# Patient Record
Sex: MALE | Race: WHITE | NOT HISPANIC OR LATINO | Employment: OTHER | URBAN - METROPOLITAN AREA
[De-identification: names, ages, dates, MRNs, and addresses within clinical notes are randomized per-mention and may not be internally consistent; named-entity substitution may affect disease eponyms.]

---

## 2017-01-01 ENCOUNTER — GENERIC CONVERSION - ENCOUNTER (OUTPATIENT)
Dept: OTHER | Facility: OTHER | Age: 82
End: 2017-01-01

## 2017-02-21 ENCOUNTER — ALLSCRIPTS OFFICE VISIT (OUTPATIENT)
Dept: OTHER | Facility: OTHER | Age: 82
End: 2017-02-21

## 2017-06-26 ENCOUNTER — ALLSCRIPTS OFFICE VISIT (OUTPATIENT)
Dept: OTHER | Facility: OTHER | Age: 82
End: 2017-06-26

## 2017-06-28 ENCOUNTER — GENERIC CONVERSION - ENCOUNTER (OUTPATIENT)
Dept: OTHER | Facility: OTHER | Age: 82
End: 2017-06-28

## 2017-06-28 ENCOUNTER — LAB CONVERSION - ENCOUNTER (OUTPATIENT)
Dept: OTHER | Facility: OTHER | Age: 82
End: 2017-06-28

## 2017-06-28 LAB
A/G RATIO (HISTORICAL): 1.4 (CALC) (ref 1–2.5)
ALBUMIN SERPL BCP-MCNC: 4.3 G/DL (ref 3.6–5.1)
ALP SERPL-CCNC: 74 U/L (ref 40–115)
ALT SERPL W P-5'-P-CCNC: 17 U/L (ref 9–46)
AST SERPL W P-5'-P-CCNC: 20 U/L (ref 10–35)
BASOPHILS # BLD AUTO: 0.1 %
BASOPHILS # BLD AUTO: 8 CELLS/UL (ref 0–200)
BILIRUB SERPL-MCNC: 1.1 MG/DL (ref 0.2–1.2)
BUN SERPL-MCNC: 16 MG/DL (ref 7–25)
BUN/CREA RATIO (HISTORICAL): 13 (CALC) (ref 6–22)
CALCIUM SERPL-MCNC: 9.3 MG/DL (ref 8.6–10.3)
CHLORIDE SERPL-SCNC: 105 MMOL/L (ref 98–110)
CHOLEST SERPL-MCNC: 173 MG/DL (ref 125–200)
CHOLEST/HDLC SERPL: 4.1 (CALC)
CO2 SERPL-SCNC: 23 MMOL/L (ref 20–31)
CREAT SERPL-MCNC: 1.19 MG/DL (ref 0.7–1.11)
DEPRECATED RDW RBC AUTO: 15.9 % (ref 11–15)
EGFR AFRICAN AMERICAN (HISTORICAL): 66 ML/MIN/1.73M2
EGFR-AMERICAN CALC (HISTORICAL): 57 ML/MIN/1.73M2
EOSINOPHIL # BLD AUTO: 257 CELLS/UL (ref 15–500)
EOSINOPHIL # BLD AUTO: 3.1 %
GAMMA GLOBULIN (HISTORICAL): 3 G/DL (CALC) (ref 1.9–3.7)
GLUCOSE (HISTORICAL): 82 MG/DL (ref 65–99)
HCT VFR BLD AUTO: 45.1 % (ref 38.5–50)
HDLC SERPL-MCNC: 42 MG/DL
HGB BLD-MCNC: 14.3 G/DL (ref 13.2–17.1)
LDL CHOLESTEROL (HISTORICAL): 86 MG/DL (CALC)
LYMPHOCYTES # BLD AUTO: 2050 CELLS/UL (ref 850–3900)
LYMPHOCYTES # BLD AUTO: 24.7 %
MAGNESIUM SERPL-MCNC: 2.2 MG/DL (ref 1.5–2.5)
MCH RBC QN AUTO: 23.8 PG (ref 27–33)
MCHC RBC AUTO-ENTMCNC: 31.8 G/DL (ref 32–36)
MCV RBC AUTO: 74.8 FL (ref 80–100)
MONOCYTES # BLD AUTO: 822 CELLS/UL (ref 200–950)
MONOCYTES (HISTORICAL): 9.9 %
NEUTROPHILS # BLD AUTO: 5163 CELLS/UL (ref 1500–7800)
NEUTROPHILS # BLD AUTO: 62.2 %
NON-HDL-CHOL (CHOL-HDL) (HISTORICAL): 131 MG/DL (CALC)
PLATELET # BLD AUTO: 138 THOUSAND/UL (ref 140–400)
PMV BLD AUTO: 12.9 FL (ref 7.5–12.5)
POTASSIUM SERPL-SCNC: 5.7 MMOL/L (ref 3.5–5.3)
PROSTATE SPECIFIC ANTIGEN TOTAL (HISTORICAL): 1.8 NG/ML
RBC # BLD AUTO: 6.03 MILLION/UL (ref 4.2–5.8)
SODIUM SERPL-SCNC: 139 MMOL/L (ref 135–146)
SPECIMEN STATUS REPORT (HISTORICAL): NORMAL
TOTAL PROTEIN (HISTORICAL): 7.3 G/DL (ref 6.1–8.1)
TRIGL SERPL-MCNC: 227 MG/DL
TSH SERPL DL<=0.05 MIU/L-ACNC: 1.63 MIU/L (ref 0.4–4.5)
WBC # BLD AUTO: 8.3 THOUSAND/UL (ref 3.8–10.8)

## 2017-09-08 ENCOUNTER — GENERIC CONVERSION - ENCOUNTER (OUTPATIENT)
Dept: OTHER | Facility: OTHER | Age: 82
End: 2017-09-08

## 2017-10-12 ENCOUNTER — ALLSCRIPTS OFFICE VISIT (OUTPATIENT)
Dept: OTHER | Facility: OTHER | Age: 82
End: 2017-10-12

## 2017-10-13 ENCOUNTER — LAB CONVERSION - ENCOUNTER (OUTPATIENT)
Dept: OTHER | Facility: OTHER | Age: 82
End: 2017-10-13

## 2017-10-13 ENCOUNTER — GENERIC CONVERSION - ENCOUNTER (OUTPATIENT)
Dept: OTHER | Facility: OTHER | Age: 82
End: 2017-10-13

## 2017-10-13 LAB
A/G RATIO (HISTORICAL): 1.5 (CALC) (ref 1–2.5)
ALBUMIN SERPL BCP-MCNC: 4.3 G/DL (ref 3.6–5.1)
ALP SERPL-CCNC: 71 U/L (ref 40–115)
ALT SERPL W P-5'-P-CCNC: 12 U/L (ref 9–46)
AST SERPL W P-5'-P-CCNC: 17 U/L (ref 10–35)
BASOPHILS # BLD AUTO: 0.7 %
BASOPHILS # BLD AUTO: 59 CELLS/UL (ref 0–200)
BILIRUB SERPL-MCNC: 1.2 MG/DL (ref 0.2–1.2)
BUN SERPL-MCNC: 17 MG/DL (ref 7–25)
BUN/CREA RATIO (HISTORICAL): NORMAL (CALC) (ref 6–22)
CALCIUM SERPL-MCNC: 9.3 MG/DL (ref 8.6–10.3)
CHLORIDE SERPL-SCNC: 106 MMOL/L (ref 98–110)
CHOLEST SERPL-MCNC: 151 MG/DL
CHOLEST/HDLC SERPL: 4 (CALC)
CO2 SERPL-SCNC: 24 MMOL/L (ref 20–31)
CREAT SERPL-MCNC: 1.07 MG/DL (ref 0.7–1.11)
DEPRECATED RDW RBC AUTO: 16.2 % (ref 11–15)
EGFR AFRICAN AMERICAN (HISTORICAL): 76 ML/MIN/1.73M2
EGFR-AMERICAN CALC (HISTORICAL): 65 ML/MIN/1.73M2
EOSINOPHIL # BLD AUTO: 294 CELLS/UL (ref 15–500)
EOSINOPHIL # BLD AUTO: 3.5 %
GAMMA GLOBULIN (HISTORICAL): 2.8 G/DL (CALC) (ref 1.9–3.7)
GLUCOSE (HISTORICAL): 87 MG/DL (ref 65–99)
HCT VFR BLD AUTO: 45.1 % (ref 38.5–50)
HDLC SERPL-MCNC: 38 MG/DL
HGB BLD-MCNC: 14.4 G/DL (ref 13.2–17.1)
LDL CHOLESTEROL (HISTORICAL): 89 MG/DL (CALC)
LYMPHOCYTES # BLD AUTO: 2344 CELLS/UL (ref 850–3900)
LYMPHOCYTES # BLD AUTO: 27.9 %
MCH RBC QN AUTO: 23.2 PG (ref 27–33)
MCHC RBC AUTO-ENTMCNC: 31.9 G/DL (ref 32–36)
MCV RBC AUTO: 72.5 FL (ref 80–100)
MONOCYTES # BLD AUTO: 958 CELLS/UL (ref 200–950)
MONOCYTES (HISTORICAL): 11.4 %
NEUTROPHILS # BLD AUTO: 4746 CELLS/UL (ref 1500–7800)
NEUTROPHILS # BLD AUTO: 56.5 %
NON-HDL-CHOL (CHOL-HDL) (HISTORICAL): 113 MG/DL (CALC)
PLATELET # BLD AUTO: 171 THOUSAND/UL (ref 140–400)
PMV BLD AUTO: 12.4 FL (ref 7.5–12.5)
POTASSIUM SERPL-SCNC: 4.8 MMOL/L (ref 3.5–5.3)
RBC # BLD AUTO: 6.22 MILLION/UL (ref 4.2–5.8)
SODIUM SERPL-SCNC: 139 MMOL/L (ref 135–146)
TOTAL PROTEIN (HISTORICAL): 7.1 G/DL (ref 6.1–8.1)
TRIGL SERPL-MCNC: 147 MG/DL
TSH SERPL DL<=0.05 MIU/L-ACNC: 1.8 MIU/L (ref 0.4–4.5)
WBC # BLD AUTO: 8.4 THOUSAND/UL (ref 3.8–10.8)

## 2017-10-13 NOTE — PROGRESS NOTES
Assessment  1  Paresthesia of both feet (782 0) (R20 2)   2  Need for vaccination (V05 9) (Z23)   3  Pruritus of scalp (698 9) (L29 9)   4  Polymyalgia rheumatica (725) (M35 3)   5  Thalassemia (282 40) (D56 9)   6  Arteriosclerotic heart disease (414 00) (I25 10)    Plan  Arteriosclerotic heart disease, Benign essential hypertension, Benign prostatic  hyperplasia, Familial combined hyperlipidemia, Thalassemia    · (1) LIPID PANEL FASTING W DIRECT LDL REFLEX; Status:Canceled;    · (1) TSH WITH FT4 REFLEX; Status:Canceled; Arteriosclerotic heart disease, Benign essential hypertension, Familial combined  hyperlipidemia, Need for vaccination, Polymyalgia rheumatica, Thalassemia    · (1) CBC/PLT/DIFF; Status:Active; Requested for:12Oct2017;    · (1) COMPREHENSIVE METABOLIC PANEL; Status:Active; Requested for:12Oct2017;    · (1) LIPID PANEL FASTING W DIRECT LDL REFLEX; Status:Active; Requested  for:12Oct2017;    · (1) TSH WITH FT4 REFLEX; Status:Active; Requested for:12Oct2017;   Need for vaccination    · Fluzone High-Dose 0 5 ML Intramuscular Suspension Prefilled Syringe;  INJECT 0 5  ML Intramuscular; To Be Done: [id number]  PMH: Enlarged prostate without lower urinary tract symptoms (luts)    · Triamcinolone Acetonide 0 5 % External Cream; APPLY 2-3 TIMES DAILY TO  AFFECTED AREA(S)  Pruritus of scalp    · Triamcinolone Acetonide 0 5 % External Cream; APPLY 2-3 TIMES DAILY TO  AFFECTED AREA(S)   · Ketoconazole 2 % External Shampoo; SHAMPOO AS DIRECTED   · Triamcinolone Acetonide 0 1 % External Cream; APPLY  AND RUB  IN A THIN  FILM TO AFFECTED AREAS TWICE DAILY  (AM AND PM)    Discussion/Summary    --Paresthesias b/l feet: Patient presents with intermittent bilateral paresthesias of feet for the past few weeks  Patient admits to occasional radicular symptoms bilaterally, but denies any back pain  His exam today is unremarkable  Will check labs  If negative, consider checking EMG lower extremities  pruritus: Rx given triamcinolone cream when necessary  Call further problemscontrolled on lisinopril 10 mg qdstable  pt continues to lead an active lifestyle  denies any cp/sob  cont yearly f/u w/ cardiology (dr Litzy Ahmadi: stable  will cont to monitor cbcsof PMR: No recent symptoms  shot todaycall w/ lab resultsmos  Possible side effects of new medications were reviewed with the patient/guardian today  The treatment plan was reviewed with the patient/guardian  The patient/guardian understands and agrees with the treatment plan      Chief Complaint  Pt c/o B/L foot pain x3-4 days  Pt describes it as a nerve pain (sometimes sharp, and sometimes radiates down leg)  Pt has not taken anything for symptoms  Pt would like flu shot today  Pt would like refills of Ketoconazole shampoo and Triamcinolone cream       History of Present Illness  HPI: Patient presents with intermittent bilateral paresthesias of feet for the past few weeks  Patient admits to occasional radicular symptoms bilaterally, but denies any back pain  Otherwise he is feeling well  Doing well on blood pressure medication, lisinopril  No recent issues with polymyalgia symptoms  Patient does still complain of occasional scalp  He is and is requesting refill of his topical medication      Review of Systems    Constitutional: no fever or chills, feels well, no tiredness, no recent weight loss or weight gain  Cardiovascular: no complaints of slow or fast heart rate, no chest pain, no palpitations, no leg claudication or lower extremity edema  Respiratory: no complaints of shortness of breath, no wheezing or cough, no dyspnea on exertion, no orthopnea or PND  Gastrointestinal: no complaints of abdominal pain, no constipation, no nausea or vomiting, no diarrhea or bloody stools  Genitourinary: no complaints of dysuria or incontinence, no hesitancy, no nocturia, no genital lesion, no inadequacy of penile erection     Musculoskeletal: no complaints of arthralgia, no myalgia, no joint swelling or stiffness, no limb pain or swelling  Neurological: as noted in HPI  Active Problems  1  Arteriosclerotic heart disease (414 00) (I25 10)   2  Benign essential hypertension (401 1) (I10)   3  Benign prostatic hyperplasia (600 00) (N40 0)   4  Erectile dysfunction (607 84) (N52 9)   5  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)   6  Intervertebral Disc Degeneration (722 6)   7  Osteoporosis (733 00) (M81 0)   8  Polymyalgia rheumatica (725) (M35 3)   9  Thalassemia (282 40) (D56 9)    Past Medical History  1  History of Abnormal blood sugar (790 29) (R73 09)   2  Acute sinusitis (461 9) (J01 90)   3  History of Chronic left shoulder pain (719 41,338 29) (M25 512,G89 29)   4  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)   5  History of Depression screening (V79 0) (Z13 89)   6  History of Depression screening negative   7  History of Dysplastic Nevus (238 2)   8  History of Encounter for prostate cancer screening (V76 44) (Z12 5)   9  History of Enlarged prostate without lower urinary tract symptoms (luts) (600 00) (N40 0)   10  History of Erectile dysfunction (607 84) (N52 9)   11  History of Esophagitis, reflux (530 11) (K21 0)   12  History of Fatigue (780 79) (R53 83)   13  History of Folliculitis (839 7) (H32 6)   14  History of Groin strain (848 8) (S76 219A)   15  History of Groin strain (848 8) (S76 219A)   16  History of anemia (V12 3) (Z86 2)   17  History of chronic rhinitis (V12 69) (Z87 09)   18  History of dermatitis (V13 3) (Z87 2)   19  History of herpes zoster (V12 09) (Z86 19)   20  History of molluscum contagiosum (V12 00) (Z86 19)   21  History of seborrheic keratosis (V13 3) (Z87 2)   22  History of squamous cell carcinoma of skin (V10 83) (Z85 828)   23  History of Hyperlipidemia   24  History of Inflamed seborrheic keratosis (702 11) (L82 0)   25  History of Inflamed seborrheic keratosis (702 11) (L82 0)   26  History of Insomnia (780 52) (G47 00)   27   History of Lumbar Strain (847 2)   28  History of Memory Lapses Or Loss (780 93)   29  History of Nausea (787 02) (R11 0)   30  History of Need for 23-polyvalent pneumococcal polysaccharide vaccine (V03 82) (Z23)   31  History of Need for prophylactic vaccination and inoculation against influenza (V04 81)    (Z23)   32  History of Need for prophylactic vaccination and inoculation against influenza (V04 81)    (Z23)   33  History of Onychomycosis of toenail (110 1) (B35 1)   34  History of Plantar fasciitis (728 71) (M72 2)   35  History of Prostate cancer screening (V76 44) (Z12 5)   36  History of Screening for depression (V79 0) (Z13 89)   37  History of Screening for other and unspecified genitourinary condition (V81 6) (Z13 89)   38  History of Screening for other and unspecified genitourinary condition (V81 6) (Z13 89)   39  History of Screening for other and unspecified genitourinary condition (V81 6) (Z13 89)   40  History of Skin lesion (709 9) (L98 9)   41  History of Skin lesion of cheek (709 9) (L98 9)   42  History of Skin tag (701 9) (L91 8)   43  History of Special screening for other neurological conditions (V80 09) (Z13 89)   44  History of Special screening for other neurological conditions (V80 09) (Z13 89)   45  History of Thumb pain (729 5) (M79 646)   46  History of Thumb pain (729 5) (M79 646)  Active Problems And Past Medical History Reviewed: The active problems and past medical history were reviewed and updated today  Family History  Father    1  Family history of Diabetes mellitus    Social History   · Being A Social Drinker   · Former smoker (S69 21) (W30 582)  The social history was reviewed and updated today  The social history was reviewed and is unchanged  Surgical History  1  History of CABG   2  History of Hernia Repair   3  History of Spinal Diskectomy Lumbar    Current Meds   1  Finasteride 5 MG Oral Tablet; take one tablet by mouth daily;    Therapy: 84Obp5994 to (Evaluate:82Zcy8516)  Requested for: 18Ewp6022; Last   Rx:12Sep2017 Ordered   2  Ketoconazole 2 % External Shampoo; SHAMPOO AS DIRECTED; Therapy: 26LIF4732 to (Evaluate:38Blt8493)  Requested for: 95FUY4580; Last   Rx:11May2017 Ordered   3  Lisinopril 10 MG Oral Tablet; TAKE 1 TABLET DAILY; Therapy: 51MQR6215 to [phone])  Requested for: 01Aug2017; Last   Rx:01Aug2017 Ordered   4  Tamsulosin HCl - 0 4 MG Oral Capsule; TAKE 1 CAPSULE Bedtime; Therapy: 21CAH6632 to (Evaluate:06Nov2017)  Requested for: 46BBM3162; Last   Rx:11May2017 Ordered   5  Triamcinolone Acetonide 0 1 % External Cream; APPLY  AND RUB  IN A THIN FILM TO   AFFECTED AREAS TWICE DAILY  (AM AND PM); Therapy: 38PND5953 to (Last Rx:21Nov2016)  Requested for: 21Nov2016 Ordered   6  Triamcinolone Acetonide 0 5 % External Cream; APPLY 2-3 TIMES DAILY TO   AFFECTED AREA(S); Therapy: 36GBJ3229 to (Last Rx:09Ykc0314)  Requested for: 08Aug2016 Ordered   7  Zostavax 97663 UNT/0 65ML Subcutaneous Solution Reconstituted; INJECT 0 5  ML   Subcutaneous; Therapy: 05Kya3280 to (Last Rx:65Whw6696) Ordered    The medication list was reviewed and updated today  Allergies  1  Flumadine TABS   2  Vicodin TABS    Vitals   Recorded: 50WMU0883 08:23AM   Temperature 96 2 F, Tympanic   Heart Rate 50   Pulse Quality Normal   Respiration Quality Normal   Respiration 16   Systolic 042, LUE, Sitting   Diastolic 86, LUE, Sitting   Height 5 ft 8 3 in   Weight 192 lb 9 oz   BMI Calculated 29 02   BSA Calculated 2 02   O2 Saturation 98     Physical Exam    Constitutional   General appearance: No acute distress, well appearing and well nourished  Pulmonary   Respiratory effort: No increased work of breathing or signs of respiratory distress  Auscultation of lungs: Clear to auscultation, equal breath sounds bilaterally, no wheezes, no rales, no rhonci  Cardiovascular   Palpation of heart: Normal PMI, no thrills      Auscultation of heart: Normal rate and rhythm, normal S1 and S2, without murmurs  Examination of extremities for edema and/or varicosities: Normal     Carotid pulses: Normal     Lymphatic   Palpation of lymph nodes in neck: No lymphadenopathy  Psychiatric   Orientation to person, place and time: Normal     Mood and affect: Normal          Future Appointments    Date/Time Provider Specialty Site   12/28/2017 08:00 AM Melissa Pablo DO [address]     Signatures   Electronically signed by :  Jennifer Dean DO; Oct 12 2017  8:56AM EST                       (Author)

## 2017-10-25 ENCOUNTER — GENERIC CONVERSION - ENCOUNTER (OUTPATIENT)
Dept: OTHER | Facility: OTHER | Age: 82
End: 2017-10-25

## 2017-12-28 ENCOUNTER — ALLSCRIPTS OFFICE VISIT (OUTPATIENT)
Dept: OTHER | Facility: OTHER | Age: 82
End: 2017-12-28

## 2017-12-29 NOTE — PROGRESS NOTES
Assessment   1  Benign essential hypertension (401 1) (I10)   2  Arteriosclerotic heart disease (414 00) (I25 10)   3  Benign prostatic hyperplasia (600 00) (N40 0)   4  Thalassemia (282 40) (D56 9)   5  Polymyalgia rheumatica (725) (M35 3)   6  Pruritus of scalp (698 9) (L29 9)    Plan   Arteriosclerotic heart disease, Benign essential hypertension, Encounter for prostate    cancer screening, Familial combined hyperlipidemia, Polymyalgia rheumatica,    Thalassemia    · (1) PSA (SCREEN) (Dx V76 44 Screen for Prostate Cancer); Status:Hold For - Exact Date; Requested for:After [id number]; Arteriosclerotic heart disease, Benign essential hypertension, Familial combined    hyperlipidemia, Polymyalgia rheumatica, Thalassemia    · (1) CBC/PLT/DIFF; Status:Hold For - Exact Date; Requested for:After [id number];    · (1) COMPREHENSIVE METABOLIC PANEL; Status:Hold For - Exact Date; Requested    for:After [id number];    · (1) LIPID PANEL FASTING W DIRECT LDL REFLEX; Status:Hold For - Exact Date; Requested for:After 39GPB8091;    · (1) TSH WITH FT4 REFLEX; Status:Hold For - Exact Date; Requested for:After    [id number];   Pruritus of scalp    · Betamethasone Dipropionate 0 05 % External Cream; APPLY SPARINGLY TO    AFFECTED AREA(S) TWICE DAILY    Discussion/Summary      --HTN: Well controlled on lisinopril 10 mg daily pruritus/dermatitis: Some improvement on triamcinolone, but still persists  Will give Rx for diprolene cream  Will also refer to [facility]  Call further problems Patient denies any chest pain or shortness of breath  Continue regular follow-up with cardiologist Doing reasonably well on finasteride 5 mg daily  Due for PSA after January 26th  Patient prefers to wait until his next regular blood work in June Stable  Will continue to monitor CBCs PMR: Asymptomatic No recent history of problems  Patient no longer sees Rheumatology   mos (AWV and f/u), FBW prior      Possible side effects of new medications were reviewed with the patient/guardian today  The treatment plan was reviewed with the patient/guardian  The patient/guardian understands and agrees with the treatment plan      Chief Complaint   Pt presents for his 6 month follow up  Patient is here today for follow up of chronic conditions described in HPI  Review of Systems        Constitutional: No fever or chills, feels well, no tiredness, no recent weight gain or weight loss  Cardiovascular: No complaints of slow heart rate, no fast heart rate, no chest pain, no palpitations, no leg claudication, no lower extremity  Respiratory: No complaints of shortness of breath, no wheezing, no cough, no SOB on exertion, no orthopnea or PND  Gastrointestinal: No complaints of abdominal pain, no constipation, no nausea or vomiting, no diarrhea or bloody stools  Genitourinary: No complaints of dysuria, no incontinence, no hesitancy, no nocturia, no genital lesion, no testicular pain  Active Problems   1  Arteriosclerotic heart disease (414 00) (I25 10)   2  Benign essential hypertension (401 1) (I10)   3  Benign prostatic hyperplasia (600 00) (N40 0)   4  Erectile dysfunction (607 84) (N52 9)   5  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)   6  Intervertebral Disc Degeneration (722 6)   7  Need for vaccination (V05 9) (Z23)   8  Osteoporosis (733 00) (M81 0)   9  Paresthesia of both feet (782 0) (R20 2)   10  Polymyalgia rheumatica (725) (M35 3)   11  Pruritus of scalp (698 9) (L29 9)   12  Thalassemia (282 40) (D56 9)    Past Medical History   1  History of Abnormal blood sugar (790 29) (R73 09)   2  Acute sinusitis (461 9) (J01 90)   3  History of Chronic left shoulder pain (719 41,338 29) (M25 512,G89 29)   4  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)   5  History of Depression screening (V79 0) (Z13 89)   6  History of Depression screening negative   7  History of Dysplastic Nevus (238 2)   8   History of Dyspnea on exertion (786 09) (R06 09)   9  History of Encounter for prostate cancer screening (V76 44) (Z12 5)   10  History of Enlarged prostate without lower urinary tract symptoms (luts) (600 00) (N40 0)   11  History of Erectile dysfunction (607 84) (N52 9)   12  History of Esophagitis, reflux (530 11) (K21 0)   13  History of Fatigue (780 79) (R53 83)   14  History of Folliculitis (111 8) (S52 0)   15  History of Groin strain (848 8) (S76 219A)   16  History of Groin strain (848 8) (S76 219A)   17  History of anemia (V12 3) (Z86 2)   18  History of chronic rhinitis (V12 69) (Z87 09)   19  History of dermatitis (V13 3) (Z87 2)   20  History of eczema (V13 3) (Z87 2)   21  History of herpes zoster (V12 09) (Z86 19)   22  History of molluscum contagiosum (V12 00) (Z86 19)   23  History of seborrheic keratosis (V13 3) (Z87 2)   24  History of squamous cell carcinoma of skin (V10 83) (Z85 828)   25  Denied: History of substance abuse   32  History of Hyperlipidemia   27  History of Inflamed seborrheic keratosis (702 11) (L82 0)   28  History of Inflamed seborrheic keratosis (702 11) (L82 0)   29  History of Insomnia (780 52) (G47 00)   30  History of Lumbar Strain (847 2)   31  History of Memory Lapses Or Loss (780 93)   32  Denied: History of Mental health problem   35  History of Nausea (787 02) (R11 0)   34  History of Need for 23-polyvalent pneumococcal polysaccharide vaccine (V03 82) (Z23)   35  History of Need for prophylactic vaccination and inoculation against influenza (V04 81)      (Z23)   36  History of Need for prophylactic vaccination and inoculation against influenza (V04 81)      (Z23)   37  History of Onychomycosis of toenail (110 1) (B35 1)   38  History of Plantar fasciitis (728 71) (M72 2)   39  History of Prostate cancer screening (V76 44) (Z12 5)   40  History of Screening for depression (V79 0) (Z13 89)   41  History of Screening for other and unspecified genitourinary condition (V81 6) (Z05 43)   42   History of Screening for other and unspecified genitourinary condition (V81 6) (Z13 89)   43  History of Screening for other and unspecified genitourinary condition (V81 6) (Z13 89)   44  History of Skin lesion (709 9) (L98 9)   45  History of Skin lesion of cheek (709 9) (L98 9)   46  History of Skin tag (701 9) (L91 8)   47  History of Special screening for other neurological conditions (V80 09) (Z13 89)   48  History of Special screening for other neurological conditions (V80 09) (Z13 89)   49  History of Thumb pain (729 5) (M79 646)   50  History of Thumb pain (729 5) (M79 646)     The active problems and past medical history were reviewed and updated today  Surgical History   1  History of CABG   2  History of Hernia Repair   3  History of Spinal Diskectomy Lumbar    Family History   Mother    1  Denied: Family history of substance abuse   2  Denied: Family history of Mental health problem  Father    3  Family history of Diabetes mellitus   4  Denied: Family history of substance abuse   5  Denied: Family history of Mental health problem    Social History    · Being A Social Drinker   · Former smoker (Z67 06) (C07 503)  The social history was reviewed and updated today  The social history was reviewed and is unchanged  Current Meds    1  Finasteride 5 MG Oral Tablet; take one tablet by mouth daily; Therapy: 59Flf7448 to (Evaluate:89Ubr5334)  Requested for: 12Sep2017; Last     Rx:12Sep2017 Ordered   2  Lisinopril 10 MG Oral Tablet; take one tablet by mouth daily; Therapy: 73MZD9813 to (TPDLLQPA:53GBQ7749)  Requested for: 45DMA0538; Last     Rx:24Nov2017 Ordered   3  Triamcinolone Acetonide 0 5 % External Cream; APPLY 2-3 TIMES DAILY TO AFFECTED     AREA(S); Therapy: 37DZE9483 to (Last Rx:12Oct2017)  Requested for: 12Oct2017 Ordered   4  Triamcinolone Acetonide 0 5 % External Cream; APPLY 2-3 TIMES DAILY TO AFFECTED     AREA(S);      Therapy: 84QYE3252 to (Last Rx:12Oct2017)  Requested for: 12Oct2017 Ordered The medication list was reviewed and updated today  Allergies   1  Flumadine TABS   2  Vicodin TABS    Vitals   Vital Signs    Recorded: 17Taq8432 08:00AM   Temperature 97 2 F, Tympanic   Heart Rate 59   Respiration Quality Normal   Respiration 16   Systolic 136, LUE, Sitting   Diastolic 82, LUE, Sitting   Weight 197 lb 3 oz   BMI Calculated 29 72   BSA Calculated 2 04   O2 Saturation 97, RA   Pain Scale 0     Physical Exam        Constitutional      General appearance: No acute distress, well appearing and well nourished  Pulmonary      Respiratory effort: No increased work of breathing or signs of respiratory distress  Auscultation of lungs: Clear to auscultation, equal breath sounds bilaterally, no wheezes, no rales, no rhonci  Cardiovascular      Palpation of heart: Normal PMI, no thrills  Auscultation of heart: Normal rate and rhythm, normal S1 and S2, without murmurs  Examination of extremities for edema and/or varicosities: Normal        Carotid pulses: Normal        Lymphatic      Palpation of lymph nodes in neck: No lymphadenopathy  Psychiatric      Orientation to person, place and time: Normal        Mood and affect: Normal           Signatures    Electronically signed by :  Katlin Son DO; Dec 28 2017  8:37AM EST                       (Author)

## 2018-01-09 NOTE — RESULT NOTES
Verified Results  XR HAND 2 VIEW LEFT 15Jan2016 10:30AM [facility] Order Number: [id number]     Test Name Result Flag Reference   XR HAND 2 VW LEFT (Report)     LEFT HAND     INDICATION: Left hand pain  Proximal left thumb pain  M 79 646     COMPARISON: None     VIEWS: 2; 2 images     For the purposes of institution wide universal language the following terms will apply: (thumb=1st digit/finger, index finger=2nd digit/finger, long finger=3rd digit/finger, ring=4th digit/finger and small finger=5th digit/finger)     FINDINGS:     There is no acute fracture or dislocation  Mild degenerative changes base of thumb  No lytic or blastic lesions are seen  Surgical clips within the soft tissues of the distal forearm  Mild degenerative changes base of thumb  No acute osseous abnormality  XR HAND 2 VIEW RIGHT 15Jan2016 10:29AM [facility] Order Number: [id number]     Test Name Result Flag Reference   XR HAND 2 VW RIGHT (Report)     RIGHT HAND     INDICATION: Right hand pain  Proximal right thumb pain  M 79 646     COMPARISON: None     VIEWS: 2; 2 images     For the purposes of institution wide universal language the following terms will apply: (thumb=1st digit/finger, index finger=2nd digit/finger, long finger=3rd digit/finger, ring=4th digit/finger and small finger=5th digit/finger)     FINDINGS:     There is no acute fracture or dislocation  Minimal degenerative changes base of thumb  Nothing substantial      No lytic or blastic lesions are seen  Soft tissues are unremarkable  Minimal degenerative change  No acute osseous abnormality

## 2018-01-11 NOTE — RESULT NOTES
Verified Results  (Q) CBC (H/H, RBC, INDICES, WBC, PLT) 48AKM9000 06:00AM [facility] Lien     Test Name Result Flag Reference   WHITE BLOOD CELL COUNT 8 8 Thousand/uL  3 8-10 8   RED BLOOD CELL COUNT 6 28 Million/uL H 4 20-5 80   HEMOGLOBIN 14 6 g/dL  13 2-17 1   HEMATOCRIT 46 0 %  38 5-50 0   MCV 73 2 fL L 80 0-100 0   MCH 23 2 pg L 27 0-33 0   MCHC 31 7 g/dL L 32 0-36 0   RDW 15 5 % H 11 0-15 0   PLATELET COUNT 759 Thousand/uL  140-400   MPV 9 7 fL  7 5-11 5     (Q) COMPREHENSIVE METABOLIC PNL W/ADJUSTED CALCIUM 75JBB1400 06:00AM Cecilia Lien     Test Name Result Flag Reference   GLUCOSE 87 mg/dL  65-99   Fasting reference interval   UREA NITROGEN (BUN) 19 mg/dL  7-25   CREATININE 1 20 mg/dL H 0 70-1 18   For patients >52years of age, the reference limit  for Creatinine is approximately 13% higher for people  identified as -American  eGFR NON-AFR  AMERICAN 57 mL/min/1 73m2 L > OR = 60   eGFR AFRICAN AMERICAN 66 mL/min/1 73m2  > OR = 60   BUN/CREATININE RATIO 16 (calc)  6-22   AST 19 U/L  10-35   ALT 15 U/L  9-46   PROTEIN, TOTAL 7 2 g/dL  6 1-8 1   ALBUMIN 4 3 g/dL  3 6-5 1   GLOBULIN 2 9 g/dL (calc)  1 9-3 7   ALBUMIN/GLOBULIN RATIO 1 5 (calc)  1 0-2 5   BILIRUBIN, TOTAL 1 0 mg/dL  0 2-1 2   ALKALINE PHOSPHATASE 61 U/L     SODIUM 140 mmol/L  135-146   POTASSIUM 5 0 mmol/L  3 5-5 3   CHLORIDE 106 mmol/L     CARBON DIOXIDE 27 mmol/L  20-31   CALCIUM 9 6 mg/dL  8 6-10 3   CALCIUM (ADJUSTED FOR$ALBUMIN) 9 7 mg/dL (calc)  8 6-10 2     (Q) LIPID PANEL WITH DIRECT LDL 75RND9987 06:00AM Cecilia Lien     Test Name Result Flag Reference   CHOLESTEROL, TOTAL 197 mg/dL  125-200   HDL CHOLESTEROL 44 mg/dL  > OR = 40   TRIGLICERIDES 690 mg/dL H <150   DIRECT  mg/dL  <130   Desirable range <100 mg/dL for patients with CHD or  diabetes and <70 mg/dL for diabetic patients with  known heart disease     CHOL/HDLC RATIO 4 5 (calc)  < OR = 5 0   NON HDL CHOLESTEROL 153 mg/dL (calc)     Target for non-HDL cholesterol is 30 mg/dL higher than   LDL cholesterol target  (Q) TSH, 3RD GENERATION W/REFLEX TO FT4 08ULC6482 06:00AM Marci Solano     Test Name Result Flag Reference   TSH W/REFLEX TO FT4 1 71 mIU/L  0 40-4 50   NO COLLECTION DATE RECEIVED  WE HAVE USED  THE DATE THE SPECIMEN WAS RECEIVED BY THIS  LABORATORY AS THE COLLECTION DATE  IF THIS  IS INCORRECT, PLEASE CONTACT CLIENT SERVICES    PHONE NUMBER: [phone]

## 2018-01-12 NOTE — RESULT NOTES
Verified Results  * XR SHOULDER 2+ VIEW LEFT [id number] 09:50AM Eric Connell Order Number: [id number]     Test Name Result Flag Reference   XR SHOULDER 2+ VW LEFT (Report)     LEFT SHOULDER     INDICATION: Limited range of motion and crunching sound and pain for one week     COMPARISON: None     VIEWS: 3; 3 images     FINDINGS:     There is no acute fracture or dislocation  There is minimal degenerative change suggested at the acromioclavicular joint  The glenohumeral joint appears within normal limits  No lytic or blastic lesions are seen  Evidence of prior CABG noted incidentally  Regional soft tissues intact  IMPRESSION:     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

## 2018-01-13 VITALS
BODY MASS INDEX: 29.78 KG/M2 | HEART RATE: 57 BPM | OXYGEN SATURATION: 97 % | SYSTOLIC BLOOD PRESSURE: 130 MMHG | HEIGHT: 68 IN | RESPIRATION RATE: 16 BRPM | DIASTOLIC BLOOD PRESSURE: 70 MMHG | WEIGHT: 196.5 LBS | TEMPERATURE: 95.7 F

## 2018-01-13 VITALS
RESPIRATION RATE: 16 BRPM | HEIGHT: 68 IN | HEART RATE: 50 BPM | SYSTOLIC BLOOD PRESSURE: 124 MMHG | BODY MASS INDEX: 29.18 KG/M2 | WEIGHT: 192.56 LBS | TEMPERATURE: 96.2 F | DIASTOLIC BLOOD PRESSURE: 86 MMHG | OXYGEN SATURATION: 98 %

## 2018-01-14 NOTE — RESULT NOTES
Verified Results  * XR FOOT 3+ VIEW RIGHT 24Jun2016 11:30AM Bobby Moore Order Number: [id number]     Test Name Result Flag Reference   XR FOOT 3+ VW RIGHT (Report)     RIGHT FOOT     INDICATION: Right heel pain while walking  COMPARISON: None     VIEWS: 3; 3 images     FINDINGS:     There is no acute fracture or dislocation  No degenerative changes  No lytic or blastic lesions are seen  There are scattered arterial calcifications  IMPRESSION:     No acute osseous abnormality         Workstation performed: [id number]     Signed by:   Estee Abraham MD   6/26/16

## 2018-01-15 VITALS
BODY MASS INDEX: 29.77 KG/M2 | WEIGHT: 201 LBS | TEMPERATURE: 98.1 F | HEIGHT: 69 IN | DIASTOLIC BLOOD PRESSURE: 70 MMHG | OXYGEN SATURATION: 95 % | SYSTOLIC BLOOD PRESSURE: 120 MMHG | HEART RATE: 57 BPM

## 2018-01-15 NOTE — RESULT NOTES
Verified Results  (1) CBC/PLT/DIFF [id number] 12:00AM [facility]     Test Name Result Flag Reference   WHITE BLOOD CELL COUNT 8 3 Thousand/uL  3 8-10 8   RED BLOOD CELL COUNT 6 03 Million/uL H 4 20-5 80   HEMOGLOBIN 14 3 g/dL  13 2-17 1   HEMATOCRIT 45 1 %  38 5-50 0   MCV 74 8 fL L 80 0-100 0   MCH 23 8 pg L 27 0-33 0   MCHC 31 8 g/dL L 32 0-36 0   RDW 15 9 % H 11 0-15 0   PLATELET COUNT 251 Thousand/uL L 140-400   ABSOLUTE NEUTROPHILS 5163 cells/uL  7924-4612   ABSOLUTE LYMPHOCYTES 2050 cells/uL  850-3900   ABSOLUTE MONOCYTES 822 cells/uL  200-950   ABSOLUTE EOSINOPHILS 257 cells/uL     ABSOLUTE BASOPHILS 8 cells/uL  0-200   NEUTROPHILS 62 2 %     LYMPHOCYTES 24 7 %     MONOCYTES 9 9 %     EOSINOPHILS 3 1 %     BASOPHILS 0 1 %     MPV 12 9 fL H 7 5-12 5     (1) COMPREHENSIVE METABOLIC PANEL [id number] 97:98HK [facility]     Test Name Result Flag Reference   GLUCOSE 82 mg/dL  65-99   Fasting reference interval   UREA NITROGEN (BUN) 16 mg/dL  7-25   CREATININE 1 19 mg/dL H 0 70-1 11   For patients >52years of age, the reference limit  for Creatinine is approximately 13% higher for people  identified as -American  eGFR NON-AFR   AMERICAN 57 mL/min/1 73m2 L > OR = 60   eGFR AFRICAN AMERICAN 66 mL/min/1 73m2  > OR = 60   BUN/CREATININE RATIO 13 (calc)  6-22   SODIUM 139 mmol/L  135-146   POTASSIUM 5 7 mmol/L H 3 5-5 3   CHLORIDE 105 mmol/L     CARBON DIOXIDE 23 mmol/L  20-31   CALCIUM 9 3 mg/dL  8 6-10 3   PROTEIN, TOTAL 7 3 g/dL  6 1-8 1   ALBUMIN 4 3 g/dL  3 6-5 1   GLOBULIN 3 0 g/dL (calc)  1 9-3 7   ALBUMIN/GLOBULIN RATIO 1 4 (calc)  1 0-2 5   BILIRUBIN, TOTAL 1 1 mg/dL  0 2-1 2   ALKALINE PHOSPHATASE 74 U/L     AST 20 U/L  10-35   ALT 17 U/L  9-46     (1) PSA (SCREEN) (Dx V76 44 Screen for Prostate Cancer) [id number] 12:00AM [facility]     Test Name Result Flag Reference   PSA, TOTAL 1 8 ng/mL  < OR = 4 0   The total PSA value from this assay system is   standardized against the WHO standard  The test   result will be approximately 20% lower when compared   to the equimolar-standardized total PSA (Wilson   Chandler)  Comparison of serial PSA results should be   interpreted with this fact in mind  This test was performed using the Siemens   chemiluminescent method  Values obtained from   different assay methods cannot be used  interchangeably  PSA levels, regardless of  value, should not be interpreted as absolute  evidence of the presence or absence of disease  (1) MAGNESIUM [id number] 12:00AM [facility]     Test Name Result Flag Reference   MAGNESIUM 2 2 mg/dL  1 5-2 5     (Q) LIPID PANEL WITH REFLEX TO DIRECT LDL [id number] 12:00AM [facility]     Test Name Result Flag Reference   CHOLESTEROL, TOTAL 173 mg/dL  125-200   HDL CHOLESTEROL 42 mg/dL  > OR = 40   TRIGLICERIDES 648 mg/dL H <150   LDL-CHOLESTEROL 86 mg/dL (calc)  <130   Desirable range <100 mg/dL for patients with CHD or  diabetes and <70 mg/dL for diabetic patients with  known heart disease  CHOL/HDLC RATIO 4 1 (calc)  < OR = 5 0   NON HDL CHOLESTEROL 131 mg/dL (calc)     Target for non-HDL cholesterol is 30 mg/dL higher than   LDL cholesterol target  SPECIMEN INTEGRITY COMPROMISED [id number] 12:00AM [facility]     Test Name Result Flag Reference   SPECIMEN INTEGRITY COMPROMISED See Below     Whole blood, unspun or partially spun gel barrier tube  was received more than 6 hours since collection  A   false elevation of K, Phos and LD as well as a false   decrease in glucose may occur due to prolonged contact   with red cells       (Q) TSH, 3RD GENERATION W/REFLEX TO FT4 [id number] 12:00AM [facility]     Test Name Result Flag Reference   TSH W/REFLEX TO FT4 1 63 mIU/L  0 40-4 50

## 2018-01-17 NOTE — RESULT NOTES
Verified Results  (1) CBC/PLT/DIFF 12Oct2017 12:00AM [facility]     Test Name Result Flag Reference   WHITE BLOOD CELL COUNT 8 4 Thousand/uL  3 8-10 8   RED BLOOD CELL COUNT 6 22 Million/uL H 4 20-5 80   HEMOGLOBIN 14 4 g/dL  13 2-17 1   HEMATOCRIT 45 1 %  38 5-50 0   MCV 72 5 fL L 80 0-100 0   MCH 23 2 pg L 27 0-33 0   MCHC 31 9 g/dL L 32 0-36 0   RDW 16 2 % H 11 0-15 0   PLATELET COUNT 763 Thousand/uL  140-400   ABSOLUTE NEUTROPHILS 4746 cells/uL  5851-0775   ABSOLUTE LYMPHOCYTES 2344 cells/uL  850-3900   ABSOLUTE MONOCYTES 958 cells/uL H 200-950   ABSOLUTE EOSINOPHILS 294 cells/uL     ABSOLUTE BASOPHILS 59 cells/uL  0-200   NEUTROPHILS 56 5 %     LYMPHOCYTES 27 9 %     MONOCYTES 11 4 %     EOSINOPHILS 3 5 %     BASOPHILS 0 7 %     MPV 12 4 fL  7 5-12 5     (1) COMPREHENSIVE METABOLIC PANEL [id number] 81:49HE [facility]     Test Name Result Flag Reference   GLUCOSE 87 mg/dL  65-99   Fasting reference interval   UREA NITROGEN (BUN) 17 mg/dL  7-25   CREATININE 1 07 mg/dL  0 70-1 11   For patients >52years of age, the reference limit  for Creatinine is approximately 13% higher for people  identified as -American  eGFR NON-AFR   AMERICAN 65 mL/min/1 73m2  > OR = 60   eGFR AFRICAN AMERICAN 76 mL/min/1 73m2  > OR = 60   BUN/CREATININE RATIO   3-75   NOT APPLICABLE (calc)   SODIUM 139 mmol/L  135-146   POTASSIUM 4 8 mmol/L  3 5-5 3   CHLORIDE 106 mmol/L     CARBON DIOXIDE 24 mmol/L  20-31   CALCIUM 9 3 mg/dL  8 6-10 3   PROTEIN, TOTAL 7 1 g/dL  6 1-8 1   ALBUMIN 4 3 g/dL  3 6-5 1   GLOBULIN 2 8 g/dL (calc)  1 9-3 7   ALBUMIN/GLOBULIN RATIO 1 5 (calc)  1 0-2 5   BILIRUBIN, TOTAL 1 2 mg/dL  0 2-1 2   ALKALINE PHOSPHATASE 71 U/L     AST 17 U/L  10-35   ALT 12 U/L  9-46     (Q) LIPID PANEL WITH REFLEX TO DIRECT LDL 12Oct2017 12:00AM [facility]     Test Name Result Flag Reference   CHOLESTEROL, TOTAL 151 mg/dL  <200   HDL CHOLESTEROL 38 mg/dL L >89   TRIGLICERIDES 777 mg/dL  <150 LDL-CHOLESTEROL 89 mg/dL (calc)     Reference range: <100     Desirable range <100 mg/dL for patients with CHD or  diabetes and <70 mg/dL for diabetic patients with  known heart disease  LDL-C is now calculated using the Dieter-Donohue   calculation, which is a validated novel method providing   better accuracy than the Friedewald equation in the   estimation of LDL-C  Zach Fischer  [address] Joseph[address] Julio9;649(60): 6585-8109   (http://FibeRio/faq/YCZ141)   CHOL/HDLC RATIO 4 0 (calc)  <5 0   NON HDL CHOLESTEROL 113 mg/dL (calc)  <130   For patients with diabetes plus 1 major ASCVD risk   factor, treating to a non-HDL-C goal of <100 mg/dL   (LDL-C of <70 mg/dL) is considered a therapeutic   option       (Q) TSH, 3RD GENERATION W/REFLEX TO FT4 [id number] 12:00AM [facility]     Test Name Result Flag Reference   TSH W/REFLEX TO FT4 1 80 mIU/L  0 40-4 50

## 2018-01-22 VITALS
DIASTOLIC BLOOD PRESSURE: 82 MMHG | RESPIRATION RATE: 16 BRPM | HEART RATE: 59 BPM | SYSTOLIC BLOOD PRESSURE: 142 MMHG | TEMPERATURE: 97.2 F | BODY MASS INDEX: 29.72 KG/M2 | WEIGHT: 197.19 LBS | OXYGEN SATURATION: 97 %

## 2018-01-23 NOTE — PROCEDURES
Chief Complaint  Patient presents to have lesions on his face removed  Present times months  ? Change in appearance      Current Meds   1  Metoprolol Tartrate 50 MG Oral Tablet; take one tablet by mouth twice daily; Therapy: [id number] to (Álvarez Sit)  Requested for: 50LOF7213; Last   Rx:[id number] Ordered   2  Tamsulosin HCl - 0 4 MG Oral Capsule; TAKE 1 CAPSULE Bedtime; Therapy: 57OGC5893 to (Evaluate:04Oct2016)  Requested for: 07Apr2016; Last   Rx:07Apr2016 Ordered   3  Triamcinolone Acetonide 0 5 % External Cream; APPLY 2-3 TIMES DAILY TO AFFECTED   AREA(S); Therapy: 09MFU2880 to (Last Rx:13Oct2014)  Requested for: 13Oct2014 Ordered   4  Viagra 100 MG Oral Tablet; TAKE AS DIRECTED; Therapy: 56BTE4062 to (Last Rx:12Jan2016)  Requested for: 12Jan2016 Ordered   5  Zostavax 92515 UNT/0 65ML Subcutaneous Solution Reconstituted; INJECT 0 5  ML   Subcutaneous; Therapy: 58Eem7656 to (Last Rx:06Dpg2351) Ordered    Allergies    1  Flumadine TABS   2  Vicodin TABS    Procedure    Procedure: excision of lesion  Risks and infection risk were discussed with the patient  Written consent was obtained prior to the procedure  Procedure Note:   Anesthesia: 3 mm raised lesion left cheek  Of lidocaine 1% without epinephrine  The patient was prepped and draped in the usual sterile fashion using Betadine and using alcohol  a 3 mm shave biopsy of the lesion was taken  The base of the wound was cauterized with cautery  Skin Lesion #2:   Procedure Note:   Anesthesia: 3 SKs on forehead  Destruction Technique: liquid nitrogen application  Post-Procedure:      Assessment    1  Inflamed seborrheic keratosis (702 11) (L82 0)   2  Skin lesion of cheek (709 9) (L98 9)    Discussion/Summary    --Skin lesion left cheek: Raised 3 mm pigmented lesion was shaved today  Base was cauterized  Patient tolerated procedure well  --Inflamed seborrheic keratoses on forehead (X3): Liquid nitrogen applied Ã3 lesions   Patient tolerated procedure well    Discussed sun precautions today  Future Appointments    Date/Time Provider Specialty Site   12/23/2016 09:00 AM Rubina Howard DO Family Medicine [facility] [place]     Signatures   Electronically signed by :  Lilliam Santiago DO; Jun 28 2016  3:35PM EST                       (Author)

## 2018-02-02 ENCOUNTER — APPOINTMENT (OUTPATIENT)
Dept: RADIOLOGY | Facility: MEDICAL CENTER | Age: 83
End: 2018-02-02
Payer: COMMERCIAL

## 2018-02-02 ENCOUNTER — TRANSCRIBE ORDERS (OUTPATIENT)
Dept: ADMINISTRATIVE | Facility: HOSPITAL | Age: 83
End: 2018-02-02

## 2018-02-02 ENCOUNTER — OFFICE VISIT (OUTPATIENT)
Dept: FAMILY MEDICINE CLINIC | Facility: CLINIC | Age: 83
End: 2018-02-02
Payer: COMMERCIAL

## 2018-02-02 VITALS
HEART RATE: 67 BPM | TEMPERATURE: 98 F | RESPIRATION RATE: 15 BRPM | SYSTOLIC BLOOD PRESSURE: 116 MMHG | DIASTOLIC BLOOD PRESSURE: 60 MMHG | OXYGEN SATURATION: 96 %

## 2018-02-02 DIAGNOSIS — M54.32 SCIATICA OF LEFT SIDE: Primary | ICD-10-CM

## 2018-02-02 DIAGNOSIS — M54.32 SCIATICA OF LEFT SIDE: ICD-10-CM

## 2018-02-02 PROCEDURE — 72110 X-RAY EXAM L-2 SPINE 4/>VWS: CPT

## 2018-02-02 PROCEDURE — 99214 OFFICE O/P EST MOD 30 MIN: CPT | Performed by: FAMILY MEDICINE

## 2018-02-02 RX ORDER — LISINOPRIL 10 MG/1
1 TABLET ORAL DAILY
COMMUNITY
Start: 2016-07-15 | End: 2018-02-07 | Stop reason: SDUPTHER

## 2018-02-02 RX ORDER — FINASTERIDE 5 MG/1
1 TABLET, FILM COATED ORAL DAILY
COMMUNITY
Start: 2017-02-21 | End: 2018-07-09 | Stop reason: ALTCHOICE

## 2018-02-02 RX ORDER — BETAMETHASONE DIPROPIONATE 0.5 MG/G
CREAM TOPICAL 2 TIMES DAILY
COMMUNITY
Start: 2017-12-28 | End: 2018-07-09 | Stop reason: ALTCHOICE

## 2018-02-02 RX ORDER — TRAMADOL HYDROCHLORIDE 50 MG/1
50 TABLET ORAL EVERY 6 HOURS PRN
Qty: 15 TABLET | Refills: 0 | Status: SHIPPED | OUTPATIENT
Start: 2018-02-02 | End: 2019-06-12 | Stop reason: ALTCHOICE

## 2018-02-02 RX ORDER — ASPIRIN 81 MG/1
81 TABLET, CHEWABLE ORAL
COMMUNITY
Start: 2012-08-29

## 2018-02-02 NOTE — PROGRESS NOTES
Assessment/Plan:    Sciatica of left side   3 day history of left-sided low back pain with radiation into buttock and left lateral thigh  Pain started after patient was lifting a bag of bird seed  Physical exam consistent with sciatica  Will give Rx for Voltaren gel and small amount of tramadol ( 15 )  Will sent for x-rays of lumbar spine  Will call with results  Possible referral to physical therapy  Diagnoses and all orders for this visit:    Sciatica of left side  -     XR spine lumbar minimum 4 views non injury; Future  -     diclofenac sodium (VOLTAREN) 1 %; Apply 2 g topically 4 (four) times a day  -     traMADol (ULTRAM) 50 mg tablet; Take 1 tablet (50 mg total) by mouth every 6 (six) hours as needed for moderate pain    Other orders  -     Ascorbic Acid, Vitamin C, (VITAMIN C) 100 MG tablet; Take 1,000 mg by mouth  -     aspirin 81 mg chewable tablet; Chew 81 mg  -     betamethasone dipropionate (DIPROSONE) 0 05 % cream; Apply topically 2 (two) times a day  -     calcium carbonate 1250 MG capsule; Take 1,250 mg by mouth  -     co-enzyme Q-10 30 mg; Take 30 mg by mouth  -     finasteride (PROSCAR) 5 mg tablet; Take 1 tablet by mouth daily  -     lisinopril (ZESTRIL) 10 mg tablet; Take 1 tablet by mouth daily  -     Pediatric Multivitamins-Iron (POLY-VI-SOL/IRON PO); DAILY  -     Zoster Vaccine Live (ZOSTAVAX) 17641 UNT/0 65ML SUSR; Inject 0 5 mL under the skin          Subjective:      Patient ID: Spike Dale is a [de-identified] y o  male  3 day hx of left sided low back pain, since lifting a bag of birdseed  Pain radiates down L buttock/lat thigh  Taking aleve w/o much benefit  The following portions of the patient's history were reviewed and updated as appropriate: allergies, current medications, past family history, past medical history, past social history, past surgical history and problem list     Review of Systems   Musculoskeletal: Positive for back pain           Objective:     Physical Exam   Musculoskeletal:   Point tender L lumbar  + SLR LLE

## 2018-02-02 NOTE — ASSESSMENT & PLAN NOTE
3 day history of left-sided low back pain with radiation into buttock and left lateral thigh  Pain started after patient was lifting a bag of bird seed  Physical exam consistent with sciatica  Will give Rx for Voltaren gel and small amount of tramadol ( 15 )  Will sent for x-rays of lumbar spine  Will call with results  Possible referral to physical therapy

## 2018-02-07 DIAGNOSIS — E78.2 MIXED HYPERLIPIDEMIA: Primary | ICD-10-CM

## 2018-02-07 RX ORDER — LISINOPRIL 10 MG/1
TABLET ORAL
Qty: 30 TABLET | Refills: 0 | Status: SHIPPED | OUTPATIENT
Start: 2018-02-07 | End: 2018-03-14 | Stop reason: SDUPTHER

## 2018-02-08 DIAGNOSIS — N40.0 BENIGN PROSTATIC HYPERPLASIA, UNSPECIFIED WHETHER LOWER URINARY TRACT SYMPTOMS PRESENT: Primary | ICD-10-CM

## 2018-02-09 ENCOUNTER — TELEPHONE (OUTPATIENT)
Dept: FAMILY MEDICINE CLINIC | Facility: CLINIC | Age: 83
End: 2018-02-09

## 2018-02-09 DIAGNOSIS — G89.29 CHRONIC LOW BACK PAIN, UNSPECIFIED BACK PAIN LATERALITY, WITH SCIATICA PRESENCE UNSPECIFIED: Primary | ICD-10-CM

## 2018-02-09 DIAGNOSIS — M54.5 CHRONIC LOW BACK PAIN, UNSPECIFIED BACK PAIN LATERALITY, WITH SCIATICA PRESENCE UNSPECIFIED: Primary | ICD-10-CM

## 2018-02-09 RX ORDER — TAMSULOSIN HYDROCHLORIDE 0.4 MG/1
CAPSULE ORAL
Qty: 90 CAPSULE | Refills: 0 | Status: SHIPPED | OUTPATIENT
Start: 2018-02-09 | End: 2018-07-09 | Stop reason: ALTCHOICE

## 2018-02-09 NOTE — TELEPHONE ENCOUNTER
Pt went to [address] physical therapy in [place], [place]  Dalton Owen did recommend pt go to physical therapy due to his x ray results  Currently there is a message out for Dr almonte to order physical therapy  for pt  Once orders are placed Mary Hernandez at PT will be able to see it

## 2018-02-13 ENCOUNTER — EVALUATION (OUTPATIENT)
Dept: PHYSICAL THERAPY | Facility: CLINIC | Age: 83
End: 2018-02-13
Payer: COMMERCIAL

## 2018-02-13 DIAGNOSIS — M54.42 ACUTE LEFT-SIDED LOW BACK PAIN WITH LEFT-SIDED SCIATICA: Primary | ICD-10-CM

## 2018-02-13 PROCEDURE — G8990 OTHER PT/OT CURRENT STATUS: HCPCS | Performed by: PHYSICAL THERAPIST

## 2018-02-13 PROCEDURE — G8991 OTHER PT/OT GOAL STATUS: HCPCS | Performed by: PHYSICAL THERAPIST

## 2018-02-13 PROCEDURE — 97162 PT EVAL MOD COMPLEX 30 MIN: CPT | Performed by: PHYSICAL THERAPIST

## 2018-02-13 PROCEDURE — 97110 THERAPEUTIC EXERCISES: CPT | Performed by: PHYSICAL THERAPIST

## 2018-02-13 NOTE — PROGRESS NOTES
PT Evaluation     Today's date: 2018  Patient name: Tracey Nugent  : 1935  MRN: [id number]  Referring provider: Stefanie Marcelo DO  Dx:   Encounter Diagnosis   Name Primary?  Acute left-sided low back pain with left-sided sciatica Yes              Assessment  Impairments: abnormal or restricted ROM, activity intolerance, impaired physical strength and pain with function    Assessment details: Pt is an [de-identified]year old male presenting to PT with 2 week history of left-sided low back pain with occasional radiation into buttock and left lateral lower leg  He presents with low back pain, decreased lumbar range of motion, decreased lower extremity strength/flexibility, and decreased tolerance to activity  Patient would benefit from skilled PT services to address these issues and to maximize function  Thank you for the referral      Understanding of Dx/Px/POC: good   Prognosis: good    Goals  STG  1  Decrease pain 20-50% in 4 weeks  2  Flexibility is improved in 4 weeks  3  Soft tissue inflammation or restriction is reduced by 50% in 4 weeks    LTG  1  IADL performance in related activities is improved to maximal level of function  2  Patient is independent with HEP      Plan  Planned modality interventions: thermotherapy: hydrocollator packs  Planned therapy interventions: manual therapy, stretching, strengthening, therapeutic exercise, flexibility and home exercise program  Frequency: 2x week  Duration in weeks: 6        Subjective Evaluation    History of Present Illness  Date of onset: 2018  Mechanism of injury: Pt is an [de-identified]year old male presenting to PT with 2 week history of left-sided low back pain with occasional radiation into buttock and left lateral lower leg  Symptom AGGS: prolonged sitting, bending, household work  Symptom EASE: lying down, heating pad  Pt states cold pack worsened his symptoms  Pt states lying on right side is preferred   Pain started after patient was lifting an 8lb bag of bird seed at a shoulder-height shelf, he twisted and placed the bag down  Pt states he had a quick "pinch" pain that went away, with progressive worsening of symptoms later that night  Pain starts in left side of low back and burning pain progresses down left lower leg with occasional zingers depending on the activity  No follow-up with MD scheduled at this time  Pain  Current pain ratin  At best pain ratin  At worst pain ratin  Quality: dull ache, burning and sharp  Progression: no change      Diagnostic Tests  X-ray: abnormal (Anterolateral marginal osteophyte formation present at multiple levels of the lumbar spine and lower thoracic spine    Moderate degenerative changes are noted in the posterior elements at L3-4 through L5-S1 )  Patient Goals  Patient goals for therapy: decreased pain, increased motion, independence with ADLs/IADLs and return to sport/leisure activities          Objective     Static Posture     Comments  Standing observation: significant pelvic shift to left, increased pelvic height on left  Lumbar AROM:    flexion: 50% pain   extension: 50% pain with deviation to left   lateral flexion: 50% to right, 25% to left with pain   (+) quadrant testing on left  (+) pelvic shear to right  Sitting observation: normalizing pelvic shift with less deviation in pelvic height   In prone, noticeable leg length discrepancy indicating possible functional leg length discrepancy  Tenderness to palpation left paraspinals L4   Neural tension in bilateral lower extremity   Decreased hamstring length bilaterally  Transfers: slow/antalgic   Limited knee flexion to 90* on left, cramping occurring in hamstring, reduced on right  Significant limitation into hip IR/ER PROM, most limited into ER PROM   No tenderness in bilateral piriformis musculature  No tenderness reported in left psoas     Strength/Myotome Testing     Left Hip   Planes of Motion   Flexion: 4  External rotation: 3+  Internal rotation: 4+    Right Hip   Planes of Motion   Flexion: 4  External rotation: 4+  Internal rotation: 4+    Additional Strength Details  Pain with MMT       Precautions: heart disease    Daily Treatment Diary     Manual  2/13            Prone hip IR/ER stretching Prn/as tolerated                                                                    Exercise Diary  2/13            Hip flexor EOT 2' ea            Supine piriformis stretch :30x3ea            Hamstring stretch with strap NV            Prone quad stretch NV            Lumbar pball stretch NV            LTR with pillow NV            POC to focus on lumbar mobility, lower extremity flexibility, and lower extremity strengthening                                                                                                                                                                                          Modalities  2/13            MHP to low back supine 90/90 NV pre-tx                                          HEP: supine piriformis stretch, hip flexor EOT       Flowsheet Rows    Flowsheet Row Most Recent Value   PT/OT G-Codes   Current Score  52   Projected Score  71   FOTO information reviewed  Yes   Assessment Type  Evaluation   G code set  Other PT/OT Primary   Other PT Primary Current Status ()  CK   Other PT Primary Goal Status ()

## 2018-02-15 ENCOUNTER — OFFICE VISIT (OUTPATIENT)
Dept: PHYSICAL THERAPY | Facility: CLINIC | Age: 83
End: 2018-02-15
Payer: COMMERCIAL

## 2018-02-15 DIAGNOSIS — M54.42 ACUTE LEFT-SIDED LOW BACK PAIN WITH LEFT-SIDED SCIATICA: Primary | ICD-10-CM

## 2018-02-15 PROCEDURE — 97150 GROUP THERAPEUTIC PROCEDURES: CPT

## 2018-02-15 PROCEDURE — 97110 THERAPEUTIC EXERCISES: CPT

## 2018-02-15 NOTE — PROGRESS NOTES
Daily Note     Today's date: 2/15/2018  Patient name: Tamika Montague  : 1935  MRN: [id number]  Referring provider: Nelsy Delacruz DO  Dx:   Encounter Diagnosis   Name Primary?  Acute left-sided low back pain with left-sided sciatica Yes                  Subjective: Patient reported being compliant to home program, having no difficulty completing  Objective: See treatment diary below  Progressed trunk and LE flexibility program       Assessment: Tolerated treatment well  Presence of LE flexibility deficits with progressions  Some instances of LE muscle cramping when completing hamstring stretch  Assess hip mobility next treatment  Some increased LB pain reported after prone position and post treatment  Patient would benefit from continued PT      Plan: Continue per plan of care  Progress treatment as tolerated  Precautions: heart disease    Daily Treatment Diary     Manual  2/13 2/15           Prone hip IR / ER stretching Prn/as tolerated NP                                                                   Exercise Diary  2/13 2/15           Hip flexor stretch EOT 2' ea 2 min ea           Supine piriformis stretch :30x3ea 30"x3 ea           Hamstring stretch with strap NV 30"x3 ea           Prone quad stretch NV 2 min ea           Lumbar pball stretch NV 10"x10           LTR with pillow NV 10"x10           bridges  NV           clamshells  NV           VG DLS  As able                                                                                                                                                          POC to focus on lumbar mobility, lower extremity flexibility, and lower extremity strengthening    Modalities  2/13 2/15           MHP to LB supine 90/90 NV pre-tx 10 min pre tx                                       Updated and reviewed home program with patient    HEP: supine piriformis stretch, hip flexor EOT, hamstring stretch with strap, LTR with pillow, prone quad stretch, lumbar flex stretch

## 2018-02-16 ENCOUNTER — OFFICE VISIT (OUTPATIENT)
Dept: PHYSICAL THERAPY | Facility: CLINIC | Age: 83
End: 2018-02-16
Payer: COMMERCIAL

## 2018-02-16 DIAGNOSIS — M54.42 ACUTE LEFT-SIDED LOW BACK PAIN WITH LEFT-SIDED SCIATICA: Primary | ICD-10-CM

## 2018-02-16 PROCEDURE — 97110 THERAPEUTIC EXERCISES: CPT

## 2018-02-16 NOTE — PROGRESS NOTES
Daily Note     Today's date: 2018  Patient name: Jane Mckeon  : 1935  MRN: [id number]  Referring provider: Raechel Mortimer, DO  Dx:   Encounter Diagnosis   Name Primary?  Acute left-sided low back pain with left-sided sciatica Yes                  Subjective: Patient reported increased soreness following treatment yesterday, however decreased LB pain noted this morning  No difficulty with progression of home program       Objective: See treatment diary below  Progressed strengthening  Assessment: Added LE strengthening to address deficits with no difficulty or increase in symptoms, providing cueing to maintain proper form  Mild improvement in tolerance and range with LE flexibility  Plan: Continue per plan of care  Progress treatment as tolerated  Precautions: heart disease    Daily Treatment Diary     Manual  2/13 2/15 2/16          Prone hip IR / ER stretching Prn/as tolerated NP NV                                                                  Exercise Diary  2/13 2/15 2/16          Hip flexor stretch EOT 2' ea 2 min ea 2 min ea          Supine piriformis stretch :30x3ea 30"x3 ea 30"x3 ea          Hamstring stretch with strap NV 30"x3 ea 30"x3 ea          Prone quad stretch ( pillow under trunk ) NV 2 min ea 2 min ea          Lumbar pball stretch NV 10"x10 10"x  10          LTR with pillow NV 10"x10 10"x  10 ea          Bridges  NV 5"x10          Clamshells  NV 5"x10 ea Add TB         VG DLS  As able As able                                                                                                                                                         POC to focus on lumbar mobility, lower extremity flexibility, and lower extremity strengthening  Modalities  2/13 2/15 2/16          MHP to LB supine 90/90 NV pre-tx 10 min pre tx 10 min pre tx                                      Updated and reviewed home program with patient    HEP: supine piriformis stretch, hip flexor EOT, hamstring stretch with strap, LTR with pillow, prone quad stretch, lumbar flex stretch, clamshells, bridges

## 2018-02-20 ENCOUNTER — OFFICE VISIT (OUTPATIENT)
Dept: PHYSICAL THERAPY | Facility: CLINIC | Age: 83
End: 2018-02-20
Payer: COMMERCIAL

## 2018-02-20 DIAGNOSIS — M54.42 ACUTE LEFT-SIDED LOW BACK PAIN WITH LEFT-SIDED SCIATICA: Primary | ICD-10-CM

## 2018-02-20 PROCEDURE — 97110 THERAPEUTIC EXERCISES: CPT

## 2018-02-20 NOTE — PROGRESS NOTES
Daily Note     Today's date: 2018  Patient name: Guanaco Maciel  : 1935  MRN: [id number]  Referring provider: Shital Hanna DO  Dx:   Encounter Diagnosis   Name Primary?  Acute left-sided low back pain with left-sided sciatica Yes                  Subjective: Patient reported mild presence of L sided LB pain, not severe enough to limit functional / daily activities  No increase in symptoms following progression of strengthening last treatment  Objective: See treatment diary below  Progressed LE strengthening  Assessment: Provided with frequent cueing to ensure proper form and technique with exercises  Minimal difficulty with added resistance to clamshells  Assessed hip IR mobility with ROM limitations ( b/l )  Plan: Continue per plan of care  Progress treatment as tolerated  Precautions: heart disease    Daily Treatment Diary     Manual  2/13 2/15 2/16 2/20         Prone hip IR / ER stretching Prn/as tolerated NP NV trialed                                                                 Exercise Diary  2/13 2/15 2/16 2/20         Hip flexor stretch EOT 2' ea 2 min ea 2 min ea 2 min ea         Supine piriformis stretch :30x3ea 30"x3 ea 30"x3 ea 30"x3 ea         Hamstring stretch with strap NV 30"x3 ea 30"x3 ea 30"x3 ea         Prone quad stretch ( pillow under trunk ) NV 2 min ea 2 min ea 2 min ea         Lumbar pball stretch NV 10"x10 10"x  10 10"x  10         LTR with pillow NV 10"x10 10"x  10 ea 10"x  10 ea         Bridges  NV 5"x10 5"x10         Clamshells  NV 5"x10 ea Red 5"x10 ea         VG DLS  As able As able NV                                                                                                                                                        POC to focus on lumbar mobility, lower extremity flexibility, and lower extremity strengthening      Modalities  2/13 2/15 2/16 2/20         MHP to LB supine 90/90 NV pre-tx 10 min pre tx 10 min pre tx 10 min pre tx                                     Updated and reviewed home program with patient    HEP: supine piriformis stretch, hip flexor EOT, hamstring stretch with strap, LTR with pillow, prone quad stretch, lumbar flex stretch, clamshells ( red TB ), bridges

## 2018-02-22 ENCOUNTER — OFFICE VISIT (OUTPATIENT)
Dept: PHYSICAL THERAPY | Facility: CLINIC | Age: 83
End: 2018-02-22
Payer: COMMERCIAL

## 2018-02-22 DIAGNOSIS — M54.42 ACUTE LEFT-SIDED LOW BACK PAIN WITH LEFT-SIDED SCIATICA: Primary | ICD-10-CM

## 2018-02-22 PROCEDURE — 97150 GROUP THERAPEUTIC PROCEDURES: CPT

## 2018-02-22 PROCEDURE — 97110 THERAPEUTIC EXERCISES: CPT

## 2018-02-22 NOTE — PROGRESS NOTES
Daily Note     Today's date: 2018  Patient name: Jennifer Hester  : 1935  MRN: [id number]  Referring provider: Alonzo Quinn DO  Dx:   Encounter Diagnosis   Name Primary?  Acute left-sided low back pain with left-sided sciatica Yes                  Subjective: Patient reported symptoms in LB have reduced significantly, stating he experienced minimal symptoms when arriving to treatment today  Objective: See treatment diary below  Progressed LE strengthening  Assessment: LE and trunk flexibility making visual improvement  Continues to require cueing to ensure proper form and completion of program  Fatigues with strengthening due to unresolved LE weakness  Plan: Continue per plan of care  Progress treatment as tolerated  Precautions: heart disease    Daily Treatment Diary     Manual  2/13 2/15 2/16 2/20 2/22        Prone hip IR / ER stretching Prn/as tolerated NP NV trialed NP                                                                Exercise Diary  2/13 2/15 2/16 2/20 2/22        Hip flexor stretch EOT 2' ea 2 min ea 2 min ea 2 min ea 2 min ea        Supine piriformis stretch :30x3ea 30"x3 ea 30"x3 ea 30"x3 ea 30"x3 ea        Hamstring stretch with strap NV 30"x3 ea 30"x3 ea 30"x3 ea 30"x3 ea        Prone quad stretch ( pillow under trunk ) NV 2 min ea 2 min ea 2 min ea 2 min ea        Lumbar pball stretch NV 10"x10 10"x  10 10"x  10 10"x  10        LTR with pillow NV 10"x10 10"x  10 ea 10"x  10 ea 10"x  10 ea        Bridges  NV 5"x10 5"x10 5"x10        Clamshells  NV 5"x10 ea Red 5"x10 ea Red 5"x10 ea        VG DLS  As able As able NV 40% 3 min                                                                                                                                                       POC to focus on lumbar mobility, lower extremity flexibility, and lower extremity strengthening      Modalities  2/13 2/15 2/16 2/20 2/22        MHP to LB supine 90/90 NV pre-tx 10 min pre tx 10 min pre tx 10 min pre tx 10 min pre tx                                    HEP: supine piriformis stretch, hip flexor EOT, hamstring stretch with strap, LTR with pillow, prone quad stretch, lumbar flex stretch, clamshells ( red TB ), bridges

## 2018-02-23 ENCOUNTER — OFFICE VISIT (OUTPATIENT)
Dept: PHYSICAL THERAPY | Facility: CLINIC | Age: 83
End: 2018-02-23
Payer: COMMERCIAL

## 2018-02-23 DIAGNOSIS — M54.42 ACUTE LEFT-SIDED LOW BACK PAIN WITH LEFT-SIDED SCIATICA: Primary | ICD-10-CM

## 2018-02-23 PROCEDURE — 97110 THERAPEUTIC EXERCISES: CPT

## 2018-02-23 PROCEDURE — G8992 OTHER PT/OT  D/C STATUS: HCPCS

## 2018-02-23 PROCEDURE — G8991 OTHER PT/OT GOAL STATUS: HCPCS

## 2018-02-23 PROCEDURE — 97150 GROUP THERAPEUTIC PROCEDURES: CPT

## 2018-02-23 NOTE — PROGRESS NOTES
Daily Note / Discharge Summary    Today's date: 2018  Patient name: Jez Medeiros  : 1935  MRN: [id number]  Referring provider: Brigido Willis DO  Dx:   Encounter Diagnosis   Name Primary?  Acute left-sided low back pain with left-sided sciatica Yes                  Subjective: Patient has been seen for 6 visits since   Subjective report of 100% improvement since start of treatment  Has been pain free for the last 2 days  FOTO score improved from 52 to 94 over 6 visits  Provided with home exercise program and reviewed with patient prior to discharge  Objective: See treatment diary below      Assessment: Continued use of verbal and tactile cueing for proper form / completion of program  Overall has demonstrated improved trunk and LE flexibility        Plan: Discussed between PT and patient, will discharge to independent home program       Precautions: heart disease    Daily Treatment Diary     Manual  2/13 2/15 2/16 2/20 2/22 2/23       Prone hip IR / ER stretching Prn/as tolerated NP NV trialed NP NP                                                               Exercise Diary  2/13 2/15 2/16 2/20 2/22 2/23       Hip flexor stretch EOT 2' ea 2 min ea 2 min ea 2 min ea 2 min ea 2 min ea       Supine piriformis stretch :30x3ea 30"x3 ea 30"x3 ea 30"x3 ea 30"x3 ea 30"x3 ea       Hamstring stretch with strap NV 30"x3 ea 30"x3 ea 30"x3 ea 30"x3 ea 30"x3 ea       Prone quad stretch ( pillow under trunk ) NV 2 min ea 2 min ea 2 min ea 2 min ea 2 min ea       Lumbar pball stretch NV 10"x10 10"x  10 10"x  10 10"x  10 10"x  10       LTR with pillow NV 10"x10 10"x  10 ea 10"x  10 ea 10"x  10 ea 10"x  10 ea       Bridges  NV 5"x10 5"x10 5"x10 5"x10       Clamshells  NV 5"x10 ea Red 5"x10 ea Red 5"x10 ea Green5"x10 ea       VG DLS  As able As able NV 40% 3 min 40% 3 min POC to focus on lumbar mobility, lower extremity flexibility, and lower extremity strengthening  Modalities  2/13 2/15 2/16 2/20 2/22 2/23       MHP to LB supine 90/90 NV pre-tx 10 min pre tx 10 min pre tx 10 min pre tx 10 min pre tx 10 min pre tx                                   Reviewed home program with patient    HEP: supine piriformis stretch, hip flexor EOT, hamstring stretch with strap, LTR with pillow, prone quad stretch, lumbar flex stretch, clamshells ( green TB ), bridges

## 2018-03-14 DIAGNOSIS — E78.2 MIXED HYPERLIPIDEMIA: ICD-10-CM

## 2018-03-14 RX ORDER — LISINOPRIL 10 MG/1
10 TABLET ORAL DAILY
Qty: 30 TABLET | Refills: 2 | Status: SHIPPED | OUTPATIENT
Start: 2018-03-14 | End: 2018-06-26 | Stop reason: SDUPTHER

## 2018-03-15 NOTE — TELEPHONE ENCOUNTER
Pt's rx was called into the [facility] in  [address] [place] [address] on the rx line on 3/15/18 at 8:42am  Left office number and my name just in case there are any questions

## 2018-06-20 DIAGNOSIS — I25.10 ARTERIOSCLEROTIC HEART DISEASE: ICD-10-CM

## 2018-06-20 DIAGNOSIS — Z13.29 SCREENING FOR THYROID DISORDER: ICD-10-CM

## 2018-06-20 DIAGNOSIS — D56.9 THALASSEMIA, UNSPECIFIED TYPE: ICD-10-CM

## 2018-06-20 DIAGNOSIS — Z12.5 SCREENING FOR PROSTATE CANCER: ICD-10-CM

## 2018-06-20 DIAGNOSIS — E78.49 FAMILIAL COMBINED HYPERLIPIDEMIA: ICD-10-CM

## 2018-06-20 DIAGNOSIS — I10 BENIGN ESSENTIAL HYPERTENSION: Primary | ICD-10-CM

## 2018-06-26 ENCOUNTER — CLINICAL SUPPORT (OUTPATIENT)
Dept: FAMILY MEDICINE CLINIC | Facility: CLINIC | Age: 83
End: 2018-06-26
Payer: COMMERCIAL

## 2018-06-26 DIAGNOSIS — E78.49 FAMILIAL COMBINED HYPERLIPIDEMIA: ICD-10-CM

## 2018-06-26 DIAGNOSIS — Z13.29 SCREENING FOR THYROID DISORDER: ICD-10-CM

## 2018-06-26 DIAGNOSIS — E78.2 MIXED HYPERLIPIDEMIA: ICD-10-CM

## 2018-06-26 DIAGNOSIS — Z12.5 SCREENING FOR PROSTATE CANCER: ICD-10-CM

## 2018-06-26 DIAGNOSIS — D56.9 THALASSEMIA, UNSPECIFIED TYPE: ICD-10-CM

## 2018-06-26 PROCEDURE — 36415 COLL VENOUS BLD VENIPUNCTURE: CPT | Performed by: FAMILY MEDICINE

## 2018-06-26 RX ORDER — LISINOPRIL 10 MG/1
10 TABLET ORAL DAILY
Qty: 30 TABLET | Refills: 5 | Status: SHIPPED | OUTPATIENT
Start: 2018-06-26 | End: 2019-01-07 | Stop reason: SDUPTHER

## 2018-06-26 RX ORDER — LISINOPRIL 10 MG/1
10 TABLET ORAL DAILY
Qty: 30 TABLET | Refills: 5 | Status: CANCELLED | OUTPATIENT
Start: 2018-06-26

## 2018-06-27 LAB
ALBUMIN SERPL-MCNC: 4 G/DL (ref 3.6–5.1)
ALBUMIN/GLOB SERPL: 1.3 (CALC) (ref 1–2.5)
ALP SERPL-CCNC: 65 U/L (ref 40–115)
ALT SERPL-CCNC: 16 U/L (ref 9–46)
AST SERPL-CCNC: 20 U/L (ref 10–35)
BASOPHILS # BLD AUTO: 54 CELLS/UL (ref 0–200)
BASOPHILS NFR BLD AUTO: 0.7 %
BILIRUB SERPL-MCNC: 1 MG/DL (ref 0.2–1.2)
BUN SERPL-MCNC: 15 MG/DL (ref 7–25)
BUN/CREAT SERPL: 12 (CALC) (ref 6–22)
CALCIUM SERPL-MCNC: 9.1 MG/DL (ref 8.6–10.3)
CHLORIDE SERPL-SCNC: 109 MMOL/L (ref 98–110)
CHOLEST SERPL-MCNC: 158 MG/DL
CHOLEST/HDLC SERPL: 3.9 (CALC)
CO2 SERPL-SCNC: 25 MMOL/L (ref 20–31)
CREAT SERPL-MCNC: 1.29 MG/DL (ref 0.7–1.11)
EOSINOPHIL # BLD AUTO: 270 CELLS/UL (ref 15–500)
EOSINOPHIL NFR BLD AUTO: 3.5 %
ERYTHROCYTE [DISTWIDTH] IN BLOOD BY AUTOMATED COUNT: 15.7 % (ref 11–15)
GLOBULIN SER CALC-MCNC: 3 G/DL (CALC) (ref 1.9–3.7)
GLUCOSE SERPL-MCNC: 94 MG/DL (ref 65–99)
HCT VFR BLD AUTO: 42.1 % (ref 38.5–50)
HDLC SERPL-MCNC: 41 MG/DL
HGB BLD-MCNC: 13.5 G/DL (ref 13.2–17.1)
LDLC SERPL CALC-MCNC: 93 MG/DL (CALC)
LYMPHOCYTES # BLD AUTO: 2395 CELLS/UL (ref 850–3900)
LYMPHOCYTES NFR BLD AUTO: 31.1 %
MCH RBC QN AUTO: 23.9 PG (ref 27–33)
MCHC RBC AUTO-ENTMCNC: 32.1 G/DL (ref 32–36)
MCV RBC AUTO: 74.5 FL (ref 80–100)
MONOCYTES # BLD AUTO: 963 CELLS/UL (ref 200–950)
MONOCYTES NFR BLD AUTO: 12.5 %
NEUTROPHILS # BLD AUTO: 4019 CELLS/UL (ref 1500–7800)
NEUTROPHILS NFR BLD AUTO: 52.2 %
NONHDLC SERPL-MCNC: 117 MG/DL (CALC)
PLATELET # BLD AUTO: 176 THOUSAND/UL (ref 140–400)
PMV BLD REES-ECKER: 12.4 FL (ref 7.5–12.5)
POTASSIUM SERPL-SCNC: 4.7 MMOL/L (ref 3.5–5.3)
PROT SERPL-MCNC: 7 G/DL (ref 6.1–8.1)
PSA SERPL-MCNC: 3.1 NG/ML
RBC # BLD AUTO: 5.65 MILLION/UL (ref 4.2–5.8)
SL AMB EGFR AFRICAN AMERICAN: 60 ML/MIN/1.73M2
SL AMB EGFR NON AFRICAN AMERICAN: 52 ML/MIN/1.73M2
SODIUM SERPL-SCNC: 141 MMOL/L (ref 135–146)
TRIGL SERPL-MCNC: 141 MG/DL
TSH SERPL-ACNC: 1.82 MIU/L (ref 0.4–4.5)
WBC # BLD AUTO: 7.7 THOUSAND/UL (ref 3.8–10.8)

## 2018-07-09 ENCOUNTER — OFFICE VISIT (OUTPATIENT)
Dept: FAMILY MEDICINE CLINIC | Facility: CLINIC | Age: 83
End: 2018-07-09
Payer: COMMERCIAL

## 2018-07-09 VITALS
DIASTOLIC BLOOD PRESSURE: 78 MMHG | TEMPERATURE: 97.1 F | OXYGEN SATURATION: 96 % | WEIGHT: 197 LBS | RESPIRATION RATE: 17 BRPM | HEIGHT: 68 IN | HEART RATE: 68 BPM | SYSTOLIC BLOOD PRESSURE: 132 MMHG | BODY MASS INDEX: 29.86 KG/M2

## 2018-07-09 DIAGNOSIS — M35.3 POLYMYALGIA RHEUMATICA (HCC): ICD-10-CM

## 2018-07-09 DIAGNOSIS — I25.10 ARTERIOSCLEROTIC HEART DISEASE: ICD-10-CM

## 2018-07-09 DIAGNOSIS — Z23 NEED FOR VACCINATION: ICD-10-CM

## 2018-07-09 DIAGNOSIS — I10 BENIGN ESSENTIAL HYPERTENSION: ICD-10-CM

## 2018-07-09 DIAGNOSIS — R25.2 LEG CRAMPS: ICD-10-CM

## 2018-07-09 DIAGNOSIS — D56.9 THALASSEMIA, UNSPECIFIED TYPE: ICD-10-CM

## 2018-07-09 DIAGNOSIS — Z00.00 MEDICARE ANNUAL WELLNESS VISIT, SUBSEQUENT: Primary | ICD-10-CM

## 2018-07-09 DIAGNOSIS — E78.49 FAMILIAL COMBINED HYPERLIPIDEMIA: ICD-10-CM

## 2018-07-09 DIAGNOSIS — N40.0 BENIGN PROSTATIC HYPERPLASIA WITHOUT LOWER URINARY TRACT SYMPTOMS: ICD-10-CM

## 2018-07-09 PROBLEM — E78.2 MIXED HYPERLIPIDEMIA: Status: ACTIVE | Noted: 2018-07-09

## 2018-07-09 PROCEDURE — 3075F SYST BP GE 130 - 139MM HG: CPT | Performed by: FAMILY MEDICINE

## 2018-07-09 PROCEDURE — 99397 PER PM REEVAL EST PAT 65+ YR: CPT | Performed by: FAMILY MEDICINE

## 2018-07-09 PROCEDURE — G0439 PPPS, SUBSEQ VISIT: HCPCS | Performed by: FAMILY MEDICINE

## 2018-07-09 PROCEDURE — 1101F PT FALLS ASSESS-DOCD LE1/YR: CPT | Performed by: FAMILY MEDICINE

## 2018-07-09 PROCEDURE — 99214 OFFICE O/P EST MOD 30 MIN: CPT | Performed by: FAMILY MEDICINE

## 2018-07-09 PROCEDURE — 3078F DIAST BP <80 MM HG: CPT | Performed by: FAMILY MEDICINE

## 2018-07-09 NOTE — PROGRESS NOTES
Assessment and Plan:  Problem List Items Addressed This Visit     None      Visit Diagnoses     Medicare annual wellness visit, subsequent    -  Primary        Health Maintenance Due   Topic Date Due    GLAUCOMA SCREENING 65 + YR  02/14/2004    DTaP,Tdap,and Td Vaccines (1 - Tdap) 04/02/2004      MEDICARE WELLNESS VISIT  PATIENT HAS A LIVING WILL AND HEALTHCARE POWER OF   DEPRESSION SCREEN AND FALL RISK WERE NEGATIVE  AGE APPROPRIATE VACCINATIONS WERE REVIEWED WITH PATIENT  HE HAS REFUSED ZOSTAVAX  DISCUSSED POSSIBLE FUTURE SHINGRIX ADMINISTRATION  REFUSES FURTHER COLONOSCOPIES DUE TO AGE  CURRENT WITH PSA SCREENING    HPI:  Gina Post is a 82 y o  male here for his Subsequent Wellness Visit      Patient Active Problem List   Diagnosis    Sciatica of left side    Arteriosclerotic heart disease    Benign essential hypertension    Benign prostatic hyperplasia    Erectile dysfunction    Familial combined hyperlipidemia    Degeneration of intervertebral disc    Osteoporosis    Polymyalgia rheumatica (HCC)    Thalassemia     Past Medical History:   Diagnosis Date    Abnormal blood sugar     resolved 5/28/15    Chronic left shoulder pain     last assessed 10/10/16; resolved 10/11/17    Chronic rhinitis     last assessed 2/21/17; resolved 6/26/17    Congenital anemia     Dysplastic nevus     last assessed 10/15/13; resolved 5/23/15    Eczema     last assessed 8/8/16; resolved 10/12/17    Erectile dysfunction     last assessed 12/11/14; resolved 5/28/15    Esophagitis, reflux     Fatigue     resolved 12/11/14    Herpes zoster     Hyperlipemia 04/26/2012    last assessed 10/18/13; resolved 5/28/15    Insomnia     last assessed 10/15/13; resolved 10/13/14    Memory loss     last assessed 12/6/13; resolved 10/13/14    Seborrheic keratosis     last assessed 6/23/16; resolved 10/11/17    Squamous cell carcinoma of skin     last assessed 5/28/15; resolved 10/11/17     Past Surgical History:   Procedure Laterality Date    BACK SURGERY      states he had "ruptured discs"    CARDIAC SURGERY      bypass    CORONARY ARTERY BYPASS GRAFT      4 vein 4/2000 restarting 4/2006    HERNIA REPAIR      SPINE SURGERY      diskectomy lumbar     Family History   Problem Relation Age of Onset    No Known Problems Mother     Diabetes Father     Coronary artery disease Sister      History   Smoking Status    Never Smoker   Smokeless Tobacco    Never Used     Comment: former smoker per [employer]scripts      History   Alcohol Use No     Comment: social drinker per [employer]scripts       History   Drug Use No         Current Outpatient Prescriptions   Medication Sig Dispense Refill    Ascorbic Acid, Vitamin C, (VITAMIN C) 100 MG tablet Take 1,000 mg by mouth      aspirin 81 mg chewable tablet Chew 81 mg        calcium carbonate 1250 MG capsule Take 1,250 mg by mouth      co-enzyme Q-10 30 mg Take 30 mg by mouth      lisinopril (ZESTRIL) 10 mg tablet Take 1 tablet (10 mg total) by mouth daily 30 tablet 5    betamethasone dipropionate (DIPROSONE) 0 05 % cream Apply topically 2 (two) times a day      diclofenac sodium (VOLTAREN) 1 % Apply 2 g topically 4 (four) times a day 100 g 3    finasteride (PROSCAR) 5 mg tablet Take 1 tablet by mouth daily      Pediatric Multivitamins-Iron (POLY-VI-SOL/IRON PO) DAILY      tamsulosin (FLOMAX) 0 4 mg TAKE ONE CAPSULE BY MOUTH AT BEDTIME  90 capsule 0    traMADol (ULTRAM) 50 mg tablet Take 1 tablet (50 mg total) by mouth every 6 (six) hours as needed for moderate pain 15 tablet 0    Zoster Vaccine Live (ZOSTAVAX) 36494 UNT/0 65ML SUSR Inject 0 5 mL under the skin       No current facility-administered medications for this visit        Allergies   Allergen Reactions    Codeine Dizziness    Hydrocodone-Acetaminophen     Rimantadine      Immunization History   Administered Date(s) Administered    H1N1, All Formulations 01/13/2010    Influenza 11/19/2015, 10/10/2016, 10/11/2016, 10/12/2017, 10/18/2017    Influenza Split High Dose Preservative Free IM 10/13/2014, 11/19/2015, 10/10/2016, 10/12/2017    Influenza TIV (IM) 10/09/2012, 10/15/2013    Pneumococcal Conjugate 13-Valent 03/03/2015    Pneumococcal Polysaccharide PPV23 06/26/2017    Td (adult), adsorbed 04/01/2004       Patient Care Team:  Amy Argueta DO as PCP - General    Medicare Screening Tests and Risk Assessments:  AWV Clinical     ISAR:       Once in a Lifetime Medicare Screening:       Medicare Screening Tests and Risk Assessment:   AAA Risk Assessment    Osteoporosis Risk Assessment    HIV Risk Assessment        Drug and Alcohol Use:   Tobacco use    Tobacco use duration    Tobacco Cessation Readiness    Alcohol use    Alcohol Treatment Readiness   Illicit Drug Use        Diet & Exercise:   Diet   How many servings a day of the following:   Exercise        Cognitive Impairment Screening:   Cognitive Impairment Screening        Functional Ability/Level of Safety:   Hearing    Hearing Impairment Assessment    Current Activities    Help needed with the folllowing:    ADL    Fall Risk   Injury History       Home Safety:   Home Safety Risk Factors       Advanced Directives:   Advanced Directives    Patient's End of Life Decisions        Urinary Incontinence:       Glaucoma:            Provider Screening     Preventative Screening/Counseling:   Cardiovascular Screening/Counseling:   (Labs Q5 years, EKG optional one-time)   General:  Risks and Benefits Discussed           Diabetes Screening/Counseling:   (2 tests/year if Pre-Diabetes or 1 test/year if no Diabetes)   General:  Risks and Benefits Discussed           Colorectal Cancer Screening/Counseling:   (FOBT Q1 yr; Flex Sig Q4 yrs or Q10 yrs after Screening Colonoscopy; Screening Colonoscpy Q2 yrs High Risk or Q10 yrs Low Risk; Barium Enema Q2 yrs High Risk or Q4 yrs Low Risk)   General:  Risks and Benefits Discussed           Prostate Cancer Screening/Counseling:   (Annual)    General:  Risks and Benefits Discussed          Breast Cancer Screening/Counseling:   (Baseline Age 28 - 43; Annual Age 36+)         Cervical Cancer Screening/Counseling:   (Annual for High Risk or Childbearing Age with Abnormal Pap in Last 3 yrs; Every 2 all others)         Osteoporosis Screening/Counseling:   (Every 2 Yrs if at risk or more if medically necessary)   General:  Risks and Benefits Discussed           AAA Screening/Counseling:   (Once per Lifetime with risk factors)    General:  Risks and Benefits Discussed           Glaucoma Screening/Counseling:   (Annual)   General:  Risks and Benefits Discussed          HIV Screening/Counseling:   (Voluntary; Once annually for high risk OR 3 times for Pregnancy at diagnosis of IUP; 3rd trimester; and at Labor   General:  Risks and Benefits Discussed           Hepatitis C Screening:             Immunizations:   Influenza (annual):  Risks & Benefits Discussed, Influenza UTD This Year   Pneumococcal (Once in a Lifetime):  Risks & Benefits Discussed, Lifetime Vaccine Completed   Zostavax (Medicare D Coverage, Pt >72 yo):  Risks & Benefits Discussed, Patient Declines   TD (Non-Medicare Wellness  Visit required):  Risks & Benefits Discussed       Other Preventative Couseling (Non-Medicare Wellness Visit Required):   nutrition counseling performed       Referrals (Non-Medicare Wellness Visit Required):       Medical Equipment/Suppliers:           No exam data present    Physical Exam :  Negative  Physical Exam   Constitutional: He is oriented to person, place, and time  He appears well-developed and well-nourished  HENT:   Head: Normocephalic and atraumatic  Eyes: Pupils are equal, round, and reactive to light  Neck: Neck supple  Cardiovascular: Normal rate, regular rhythm, normal heart sounds and intact distal pulses  Pulmonary/Chest: Effort normal and breath sounds normal    Abdominal: Soft   Bowel sounds are normal  Neurological: He is alert and oriented to person, place, and time  Psychiatric: He has a normal mood and affect   His behavior is normal  Judgment and thought content normal

## 2018-07-09 NOTE — ASSESSMENT & PLAN NOTE
Cholesterol 158/93  Discussed LDL goal of less than 70  Patient would prefer to stay off prescription medication    Will continue to monitor

## 2018-07-09 NOTE — ASSESSMENT & PLAN NOTE
Stable without chest pain or shortness of breath  Patient still takes  Aspirin, but now every other day due to bruising   Continue yearly follow up with cardiologist

## 2018-07-09 NOTE — ASSESSMENT & PLAN NOTE
Patient complaining of occasional leg cramps  He was prescribed quinine in the past with excellent benefit    Recommend 6-8 oz of tonic water daily p r n   If cramping continues, call office ( possible trial of requip)

## 2018-07-09 NOTE — PROGRESS NOTES
Assessment/Plan:    Arteriosclerotic heart disease   Stable without chest pain or shortness of breath  Patient still takes  Aspirin, but now every other day due to bruising  Continue yearly follow up with cardiologist     Benign essential hypertension    Reasonably controlled on lisinopril 10 mg daily    Familial combined hyperlipidemia   Cholesterol 158/93  Discussed LDL goal of less than 70  Patient would prefer to stay off prescription medication  Will continue to monitor    Polymyalgia rheumatica ([address] )   Overall doing well  Off medication  Patient no longer seeing Rheumatology    Thalassemia   Stable CB C  Will continue to monitor    Leg cramps   Patient complaining of occasional leg cramps  He was prescribed quinine in the past with excellent benefit  Recommend 6-8 oz of tonic water daily p r n   If cramping continues, call office ( possible trial of requip)       Diagnoses and all orders for this visit:    Medicare annual wellness visit, subsequent    Arteriosclerotic heart disease    Benign essential hypertension    Benign prostatic hyperplasia without lower urinary tract symptoms    Thalassemia, unspecified type    Polymyalgia rheumatica (HCC)    Familial combined hyperlipidemia    Need for vaccination  -     Zoster Vac Recomb Adjuvanted ([address]) 50 MCG SUSR; Inject 1 Dose into a muscle once for 1 dose Repeat dose in 2-6 mos    Leg cramps     YEARLY W/ LABS      Subjective:      Patient ID: Josselin Hastings is a 82 y o  male  Patient presents for recheck of chronic medical problems today  Overall is feeling well  Doing well blood pressure medication, lisinopril  Doing well on finasteride for BPH  Patient no longer sees rheumatologist for polymyalgia    Patient had labs drawn on June 26        The following portions of the patient's history were reviewed and updated as appropriate: allergies, current medications, past family history, past medical history, past social history, past surgical history and problem list     Review of Systems   Respiratory: Negative  Cardiovascular: Negative  Gastrointestinal: Negative  Genitourinary: Negative  Objective:      /78 (BP Location: Left arm, Patient Position: Sitting, Cuff Size: Large)   Pulse 68   Temp (!) 97 1 °F (36 2 °C) (Tympanic)   Resp 17   Ht 5' 7 6" (1 717 m)   Wt 89 4 kg (197 lb)   SpO2 96%   BMI 30 31 kg/m²          Physical Exam   Cardiovascular: Normal rate, regular rhythm, normal heart sounds and intact distal pulses  Carotids: no bruits  Ext: no edema   Pulmonary/Chest: Effort normal  No respiratory distress  He has no wheezes  He has no rales  Psychiatric: He has a normal mood and affect   His behavior is normal  Thought content normal

## 2018-10-10 ENCOUNTER — IMMUNIZATION (OUTPATIENT)
Dept: FAMILY MEDICINE CLINIC | Facility: CLINIC | Age: 83
End: 2018-10-10
Payer: COMMERCIAL

## 2018-10-10 DIAGNOSIS — Z23 ENCOUNTER FOR IMMUNIZATION: ICD-10-CM

## 2018-10-10 PROCEDURE — 90662 IIV NO PRSV INCREASED AG IM: CPT | Performed by: FAMILY MEDICINE

## 2018-10-10 PROCEDURE — G0008 ADMIN INFLUENZA VIRUS VAC: HCPCS | Performed by: FAMILY MEDICINE

## 2018-10-24 ENCOUNTER — OFFICE VISIT (OUTPATIENT)
Dept: FAMILY MEDICINE CLINIC | Facility: CLINIC | Age: 83
End: 2018-10-24
Payer: COMMERCIAL

## 2018-10-24 VITALS
TEMPERATURE: 96.7 F | WEIGHT: 197.4 LBS | HEART RATE: 60 BPM | OXYGEN SATURATION: 95 % | RESPIRATION RATE: 14 BRPM | BODY MASS INDEX: 29.92 KG/M2 | HEIGHT: 68 IN | DIASTOLIC BLOOD PRESSURE: 70 MMHG | SYSTOLIC BLOOD PRESSURE: 122 MMHG

## 2018-10-24 DIAGNOSIS — M25.562 CHRONIC PAIN OF LEFT KNEE: Primary | ICD-10-CM

## 2018-10-24 DIAGNOSIS — G89.29 CHRONIC PAIN OF LEFT KNEE: Primary | ICD-10-CM

## 2018-10-24 DIAGNOSIS — M25.512 ACUTE PAIN OF LEFT SHOULDER: ICD-10-CM

## 2018-10-24 PROCEDURE — 3008F BODY MASS INDEX DOCD: CPT | Performed by: FAMILY MEDICINE

## 2018-10-24 PROCEDURE — 1036F TOBACCO NON-USER: CPT | Performed by: FAMILY MEDICINE

## 2018-10-24 PROCEDURE — 1160F RVW MEDS BY RX/DR IN RCRD: CPT | Performed by: FAMILY MEDICINE

## 2018-10-24 PROCEDURE — 4040F PNEUMOC VAC/ADMIN/RCVD: CPT | Performed by: FAMILY MEDICINE

## 2018-10-24 PROCEDURE — 99214 OFFICE O/P EST MOD 30 MIN: CPT | Performed by: FAMILY MEDICINE

## 2018-10-24 NOTE — ASSESSMENT & PLAN NOTE
Pain in left knee for the past few weeks  No recent trauma  In the past few days, pain has improved  Upon examination, patient exhibits mild crepitus, otherwise normal exam   Rx given for x-rays of left knee  Will call with results    Consider referral to physical therapy versus injection

## 2018-10-24 NOTE — PROGRESS NOTES
Assessment/Plan:    Chronic pain of left knee    Pain in left knee for the past few weeks  No recent trauma  In the past few days, pain has improved  Upon examination, patient exhibits mild crepitus, otherwise normal exam   Rx given for x-rays of left knee  Will call with results  Consider referral to physical therapy versus injection    Acute pain of left shoulder  L shoulder pain  1-2 weeka  No recent trauma  Patient describes pain as quickly radiating twinges into his shoulder  They always occur at rest    The seem to have resolved in the past few days  Patient denies any chest pain or shortness of breath  Examination is unremarkable today      Consider x-rays of symptoms persist   Patient will also notify me if his symptoms began to occur with exertion  (  He will also notify his cardiologist, Dr Monae Lynch)         Diagnoses and all orders for this visit:    Chronic pain of left knee  -     XR knee 3 vw left non injury; Future    Acute pain of left shoulder          Subjective:      Patient ID: Cynthia Hernandez is a 82 y o  male  L  knee pain x 1-2 weeks  No recent trauma  Pain has improved in the past  Few days  L shoulder pain  1-2 weeka  No recent trauma  Patient describes pain as quickly radiating twinges into his shoulder  They always occur at rest    The seem to have resolved in the past few days  Patient denies any chest pain or shortness of breath  The following portions of the patient's history were reviewed and updated as appropriate: allergies, current medications, past family history, past medical history, past social history, past surgical history and problem list     Review of Systems   Respiratory: Negative  Cardiovascular: Negative  Gastrointestinal: Negative  Genitourinary: Negative      Musculoskeletal:        L shoulder pain  L knee pain         Objective:      /70 (BP Location: Left arm, Patient Position: Sitting, Cuff Size: Standard)   Pulse 60   Temp (!) 96 7 °F (35 9 °C) (Tympanic)   Resp 14   Ht 5' 7 6" (1 717 m)   Wt 89 5 kg (197 lb 6 4 oz)   SpO2 95%   BMI 30 37 kg/m²          Physical Exam   Cardiovascular: Normal rate, regular rhythm, normal heart sounds and intact distal pulses  Carotids: no bruits  Ext: no edema   Pulmonary/Chest: Effort normal  No respiratory distress  He has no wheezes  He has no rales  Musculoskeletal:   L shoulder: Full range of motion without pain or crepitus  L knee:  Full range of motion with mild crepitus  No effusion or ligamentous laxity   Psychiatric: He has a normal mood and affect   His behavior is normal  Thought content normal

## 2018-10-24 NOTE — ASSESSMENT & PLAN NOTE
L shoulder pain  1-2 weeka  No recent trauma  Patient describes pain as quickly radiating twinges into his shoulder  They always occur at rest    The seem to have resolved in the past few days  Patient denies any chest pain or shortness of breath  Examination is unremarkable today      Consider x-rays of symptoms persist   Patient will also notify me if his symptoms began to occur with exertion  (  He will also notify his cardiologist, Dr Merlene Painter)

## 2019-01-05 DIAGNOSIS — E78.2 MIXED HYPERLIPIDEMIA: ICD-10-CM

## 2019-01-05 NOTE — TELEPHONE ENCOUNTER
Pt came into the office requesting a refill    Lisinopril (ZESTRIL) 10 mg tablet  Take 1 tablet (10 mg total) by mouth daily  Qty: 30 tablet  Refill: 5    Please send to [address] [place] [address] in [place]    Pt's last appointment was 7/9/2018  Pt doesn't have any appointments scheduled at this time  Informed him he is due for his 6 month check   Pt stated he would call to schedule once he gets home and looks at his calendar

## 2019-01-07 RX ORDER — LISINOPRIL 10 MG/1
10 TABLET ORAL DAILY
Qty: 30 TABLET | Refills: 2 | Status: SHIPPED | OUTPATIENT
Start: 2019-01-07 | End: 2019-05-13 | Stop reason: SDUPTHER

## 2019-05-13 DIAGNOSIS — E78.2 MIXED HYPERLIPIDEMIA: ICD-10-CM

## 2019-05-13 RX ORDER — LISINOPRIL 10 MG/1
TABLET ORAL
Qty: 30 TABLET | Refills: 1 | Status: SHIPPED | OUTPATIENT
Start: 2019-05-13 | End: 2019-07-11 | Stop reason: SDUPTHER

## 2019-06-08 ENCOUNTER — OFFICE VISIT (OUTPATIENT)
Dept: URGENT CARE | Facility: MEDICAL CENTER | Age: 84
End: 2019-06-08
Payer: COMMERCIAL

## 2019-06-08 VITALS
WEIGHT: 191.8 LBS | BODY MASS INDEX: 29.51 KG/M2 | DIASTOLIC BLOOD PRESSURE: 70 MMHG | HEART RATE: 72 BPM | TEMPERATURE: 97.4 F | SYSTOLIC BLOOD PRESSURE: 120 MMHG | RESPIRATION RATE: 20 BRPM | OXYGEN SATURATION: 97 %

## 2019-06-08 DIAGNOSIS — R50.9 FEVER, UNSPECIFIED FEVER CAUSE: ICD-10-CM

## 2019-06-08 DIAGNOSIS — R35.0 URINARY FREQUENCY: Primary | ICD-10-CM

## 2019-06-08 LAB
SL AMB  POCT GLUCOSE, UA: NEGATIVE
SL AMB LEUKOCYTE ESTERASE,UA: NEGATIVE
SL AMB POCT BILIRUBIN,UA: NEGATIVE
SL AMB POCT BLOOD,UA: NEGATIVE
SL AMB POCT CLARITY,UA: CLEAR
SL AMB POCT COLOR,UA: YELLOW
SL AMB POCT KETONES,UA: NEGATIVE
SL AMB POCT NITRITE,UA: NEGATIVE
SL AMB POCT PH,UA: 6.5
SL AMB POCT SPECIFIC GRAVITY,UA: 1.01
SL AMB POCT URINE PROTEIN: ABNORMAL
SL AMB POCT UROBILINOGEN: NEGATIVE

## 2019-06-08 PROCEDURE — 81002 URINALYSIS NONAUTO W/O SCOPE: CPT

## 2019-06-08 PROCEDURE — 87086 URINE CULTURE/COLONY COUNT: CPT

## 2019-06-08 PROCEDURE — 99214 OFFICE O/P EST MOD 30 MIN: CPT | Performed by: FAMILY MEDICINE

## 2019-06-10 LAB — BACTERIA UR CULT: NORMAL

## 2019-06-12 ENCOUNTER — OFFICE VISIT (OUTPATIENT)
Dept: FAMILY MEDICINE CLINIC | Facility: CLINIC | Age: 84
End: 2019-06-12
Payer: COMMERCIAL

## 2019-06-12 VITALS
SYSTOLIC BLOOD PRESSURE: 120 MMHG | BODY MASS INDEX: 30.13 KG/M2 | OXYGEN SATURATION: 96 % | WEIGHT: 192 LBS | HEART RATE: 66 BPM | TEMPERATURE: 97.5 F | RESPIRATION RATE: 16 BRPM | HEIGHT: 67 IN | DIASTOLIC BLOOD PRESSURE: 80 MMHG

## 2019-06-12 DIAGNOSIS — E78.2 MIXED HYPERLIPIDEMIA: ICD-10-CM

## 2019-06-12 DIAGNOSIS — R00.2 PALPITATION: ICD-10-CM

## 2019-06-12 DIAGNOSIS — Z12.11 SCREENING FOR COLON CANCER: ICD-10-CM

## 2019-06-12 DIAGNOSIS — N40.0 BENIGN PROSTATIC HYPERPLASIA WITHOUT LOWER URINARY TRACT SYMPTOMS: ICD-10-CM

## 2019-06-12 DIAGNOSIS — Z13.29 SCREENING FOR THYROID DISORDER: ICD-10-CM

## 2019-06-12 DIAGNOSIS — K92.1 BLACK STOOL: Primary | ICD-10-CM

## 2019-06-12 DIAGNOSIS — Z13.1 SCREENING FOR DIABETES MELLITUS: ICD-10-CM

## 2019-06-12 DIAGNOSIS — Z13.6 SCREENING FOR CARDIOVASCULAR CONDITION: ICD-10-CM

## 2019-06-12 PROCEDURE — 99214 OFFICE O/P EST MOD 30 MIN: CPT | Performed by: NURSE PRACTITIONER

## 2019-06-12 PROCEDURE — 93000 ELECTROCARDIOGRAM COMPLETE: CPT | Performed by: NURSE PRACTITIONER

## 2019-06-24 ENCOUNTER — TELEPHONE (OUTPATIENT)
Dept: FAMILY MEDICINE CLINIC | Facility: CLINIC | Age: 84
End: 2019-06-24

## 2019-07-11 DIAGNOSIS — E78.2 MIXED HYPERLIPIDEMIA: ICD-10-CM

## 2019-07-11 RX ORDER — LISINOPRIL 10 MG/1
TABLET ORAL
Qty: 30 TABLET | Refills: 0 | Status: SHIPPED | OUTPATIENT
Start: 2019-07-11 | End: 2019-08-19 | Stop reason: SDUPTHER

## 2019-08-19 DIAGNOSIS — E78.2 MIXED HYPERLIPIDEMIA: ICD-10-CM

## 2019-08-19 RX ORDER — LISINOPRIL 10 MG/1
TABLET ORAL
Qty: 30 TABLET | Refills: 0 | Status: SHIPPED | OUTPATIENT
Start: 2019-08-19

## 2019-12-10 ENCOUNTER — TELEPHONE (OUTPATIENT)
Dept: FAMILY MEDICINE CLINIC | Facility: CLINIC | Age: 84
End: 2019-12-10

## 2019-12-10 NOTE — TELEPHONE ENCOUNTER
[facility] with details(consent ok)   Pt to call office back to schedule appointment

## 2019-12-13 NOTE — TELEPHONE ENCOUNTER
Pt singed release forms for his medical records he is no longer a pt here in this office  Provider has been removed from his chart  Thank you!

## 2021-03-03 ENCOUNTER — TELEPHONE (OUTPATIENT)
Dept: UROLOGY | Facility: MEDICAL CENTER | Age: 86
End: 2021-03-03

## 2021-03-03 NOTE — TELEPHONE ENCOUNTER
Maggy [place] [facility] [place] for this week for retention  Patient is only able to trickle just a little and pcp believes patient will need a catheter  Do we accept the patient's insurance or is the patient Self-Pay? [employer] - pcp will put in referral       Has the patient had any previous urologist(s)? no      Have patient records been requested? yes pcp will fax over        Has the patient had any outside testing done?     No   What is the patients location preference for an office visit? [place]       Does the patient have a personal history of cancer?     No

## 2021-03-03 NOTE — TELEPHONE ENCOUNTER
Patients wife Hortencia Flores called in stated patient went to his pcp Dr Michel Greenberg ([phone]) office today and got antibiotics (Cipro 500mg)  Hortencia Flores his doctor thinks he has a enlarged prostate  Hortencia Flores asked if he can go to the [place] office   Patient can be reached at [phone]

## 2021-03-03 NOTE — TELEPHONE ENCOUNTER
Called new  pt and left msg on VM to return call to office to triage symptoms and schedule accordingly

## 2023-11-23 NOTE — TELEPHONE ENCOUNTER
Left message for wife Maria Gonzales to call office to get Teddy Mckeon set up for an appointment  Patient should be scheduled for next available with any provider   If they call back today andres has some possible openings show